# Patient Record
Sex: MALE | Race: BLACK OR AFRICAN AMERICAN | NOT HISPANIC OR LATINO | Employment: OTHER | URBAN - METROPOLITAN AREA
[De-identification: names, ages, dates, MRNs, and addresses within clinical notes are randomized per-mention and may not be internally consistent; named-entity substitution may affect disease eponyms.]

---

## 2017-07-21 ENCOUNTER — LAB VISIT (OUTPATIENT)
Dept: LAB | Facility: HOSPITAL | Age: 62
End: 2017-07-21
Attending: INTERNAL MEDICINE
Payer: MEDICARE

## 2017-07-21 ENCOUNTER — OFFICE VISIT (OUTPATIENT)
Dept: FAMILY MEDICINE | Facility: CLINIC | Age: 62
End: 2017-07-21
Payer: MEDICARE

## 2017-07-21 VITALS
DIASTOLIC BLOOD PRESSURE: 98 MMHG | WEIGHT: 212.31 LBS | BODY MASS INDEX: 32.18 KG/M2 | HEIGHT: 68 IN | OXYGEN SATURATION: 98 % | SYSTOLIC BLOOD PRESSURE: 144 MMHG | HEART RATE: 65 BPM | TEMPERATURE: 97 F | RESPIRATION RATE: 18 BRPM

## 2017-07-21 DIAGNOSIS — Z00.00 ROUTINE ADULT HEALTH MAINTENANCE: ICD-10-CM

## 2017-07-21 DIAGNOSIS — F99 PSYCHIATRIC DIAGNOSIS: ICD-10-CM

## 2017-07-21 DIAGNOSIS — Z00.00 ROUTINE ADULT HEALTH MAINTENANCE: Primary | ICD-10-CM

## 2017-07-21 DIAGNOSIS — I10 ESSENTIAL HYPERTENSION: ICD-10-CM

## 2017-07-21 DIAGNOSIS — R73.9 HYPERGLYCEMIA: ICD-10-CM

## 2017-07-21 DIAGNOSIS — Z12.5 ENCOUNTER FOR PROSTATE CANCER SCREENING: ICD-10-CM

## 2017-07-21 LAB
ALBUMIN SERPL BCP-MCNC: 4 G/DL
ALP SERPL-CCNC: 88 U/L
ALT SERPL W/O P-5'-P-CCNC: 25 U/L
ANION GAP SERPL CALC-SCNC: 11 MMOL/L
AST SERPL-CCNC: 21 U/L
BASOPHILS # BLD AUTO: 0.05 K/UL
BASOPHILS NFR BLD: 0.6 %
BILIRUB SERPL-MCNC: 0.4 MG/DL
BUN SERPL-MCNC: 21 MG/DL
CALCIUM SERPL-MCNC: 9.5 MG/DL
CHLORIDE SERPL-SCNC: 103 MMOL/L
CHOLEST/HDLC SERPL: 4.3 {RATIO}
CO2 SERPL-SCNC: 26 MMOL/L
COMPLEXED PSA SERPL-MCNC: 0.7 NG/ML
CREAT SERPL-MCNC: 1 MG/DL
DIFFERENTIAL METHOD: NORMAL
EOSINOPHIL # BLD AUTO: 0.1 K/UL
EOSINOPHIL NFR BLD: 1.6 %
ERYTHROCYTE [DISTWIDTH] IN BLOOD BY AUTOMATED COUNT: 13.1 %
EST. GFR  (AFRICAN AMERICAN): >60 ML/MIN/1.73 M^2
EST. GFR  (NON AFRICAN AMERICAN): >60 ML/MIN/1.73 M^2
GLUCOSE SERPL-MCNC: 110 MG/DL
HCT VFR BLD AUTO: 45.2 %
HDL/CHOLESTEROL RATIO: 23.5 %
HDLC SERPL-MCNC: 196 MG/DL
HDLC SERPL-MCNC: 46 MG/DL
HGB BLD-MCNC: 14.7 G/DL
LDLC SERPL CALC-MCNC: 129.2 MG/DL
LYMPHOCYTES # BLD AUTO: 2.8 K/UL
LYMPHOCYTES NFR BLD: 32.9 %
MCH RBC QN AUTO: 29.1 PG
MCHC RBC AUTO-ENTMCNC: 32.5 G/DL
MCV RBC AUTO: 90 FL
MONOCYTES # BLD AUTO: 0.4 K/UL
MONOCYTES NFR BLD: 4.9 %
NEUTROPHILS # BLD AUTO: 5 K/UL
NEUTROPHILS NFR BLD: 59.9 %
NONHDLC SERPL-MCNC: 150 MG/DL
PLATELET # BLD AUTO: 225 K/UL
PMV BLD AUTO: 11.9 FL
POTASSIUM SERPL-SCNC: 4.4 MMOL/L
PROT SERPL-MCNC: 7.4 G/DL
RBC # BLD AUTO: 5.05 M/UL
SODIUM SERPL-SCNC: 140 MMOL/L
TRIGL SERPL-MCNC: 104 MG/DL
TSH SERPL DL<=0.005 MIU/L-ACNC: 1.24 UIU/ML
WBC # BLD AUTO: 8.36 K/UL

## 2017-07-21 PROCEDURE — 80053 COMPREHEN METABOLIC PANEL: CPT

## 2017-07-21 PROCEDURE — 99205 OFFICE O/P NEW HI 60 MIN: CPT | Mod: S$PBB,,, | Performed by: INTERNAL MEDICINE

## 2017-07-21 PROCEDURE — 36415 COLL VENOUS BLD VENIPUNCTURE: CPT | Mod: PO

## 2017-07-21 PROCEDURE — 85651 RBC SED RATE NONAUTOMATED: CPT

## 2017-07-21 PROCEDURE — 86803 HEPATITIS C AB TEST: CPT

## 2017-07-21 PROCEDURE — 80061 LIPID PANEL: CPT

## 2017-07-21 PROCEDURE — 99999 PR PBB SHADOW E&M-NEW PATIENT-LVL III: CPT | Mod: PBBFAC,,, | Performed by: INTERNAL MEDICINE

## 2017-07-21 PROCEDURE — 84153 ASSAY OF PSA TOTAL: CPT

## 2017-07-21 PROCEDURE — 83036 HEMOGLOBIN GLYCOSYLATED A1C: CPT

## 2017-07-21 PROCEDURE — 84443 ASSAY THYROID STIM HORMONE: CPT

## 2017-07-21 PROCEDURE — 85025 COMPLETE CBC W/AUTO DIFF WBC: CPT

## 2017-07-21 RX ORDER — LOSARTAN POTASSIUM 25 MG/1
25 TABLET ORAL DAILY
Qty: 30 TABLET | Refills: 1 | Status: SHIPPED | OUTPATIENT
Start: 2017-07-21 | End: 2017-07-28

## 2017-07-21 NOTE — PROGRESS NOTES
Subjective:       Patient ID: Marshall Rivero is a 61 y.o. male.    Chief Complaint: Establish Care; Annual Exam; and Hypertension  ---est care----------moved back to  from [place] recently---------has hx of htn and psychiatric illness for which he has been on multiple meds---and was seeing psychiatry------on no meds now.         Has no new symptoms today---------  HPI  Review of Systems   Constitutional: Negative for activity change, appetite change, chills, diaphoresis, fatigue, fever and unexpected weight change.   HENT: Negative for drooling, ear discharge, ear pain, facial swelling, hearing loss, mouth sores, nosebleeds, postnasal drip, rhinorrhea, sinus pressure, sneezing, sore throat, tinnitus, trouble swallowing and voice change.    Eyes: Negative for photophobia, redness and visual disturbance.   Respiratory: Negative for apnea, cough, choking, chest tightness, shortness of breath and wheezing.    Cardiovascular: Negative for chest pain, palpitations and leg swelling.   Gastrointestinal: Negative for abdominal distention, abdominal pain, anal bleeding, blood in stool, constipation, diarrhea, nausea and vomiting.   Endocrine: Negative for cold intolerance, heat intolerance, polydipsia, polyphagia and polyuria.   Genitourinary: Negative for difficulty urinating, dysuria, enuresis, flank pain, frequency, genital sores, hematuria and urgency.   Musculoskeletal: Negative for arthralgias, gait problem, joint swelling, myalgias, neck pain and neck stiffness.   Skin: Negative for color change, pallor and wound.   Allergic/Immunologic: Negative for food allergies and immunocompromised state.   Neurological: Negative for dizziness, tremors, seizures, syncope, facial asymmetry, speech difficulty, weakness, light-headedness, numbness and headaches.   Hematological: Negative for adenopathy. Does not bruise/bleed easily.   Psychiatric/Behavioral: Negative for agitation, behavioral problems, confusion, decreased  concentration, dysphoric mood, hallucinations, self-injury, sleep disturbance and suicidal ideas. The patient is not nervous/anxious and is not hyperactive.        Objective:      Physical Exam   Constitutional: He is oriented to person, place, and time. He appears well-developed and well-nourished. No distress.   HENT:   Head: Normocephalic and atraumatic.   Eyes: Pupils are equal, round, and reactive to light. No scleral icterus.   Neck: Normal range of motion. Neck supple. No JVD present. Carotid bruit is not present. No tracheal deviation present. No thyromegaly present.   Cardiovascular: Normal rate, regular rhythm, normal heart sounds and intact distal pulses.    Pulmonary/Chest: Effort normal and breath sounds normal. No respiratory distress. He has no wheezes. He has no rales. He exhibits no tenderness.   Abdominal: Soft. Bowel sounds are normal. He exhibits no distension. There is no tenderness. There is no rebound and no guarding.   Musculoskeletal: Normal range of motion. He exhibits no edema or tenderness.   Lymphadenopathy:     He has no cervical adenopathy.   Neurological: He is alert and oriented to person, place, and time.   Skin: Skin is warm and dry. No rash noted. He is not diaphoretic. No erythema. No pallor.   Psychiatric: He has a normal mood and affect.   Nursing note and vitals reviewed.      Assessment:       1. Routine adult health maintenance    2. Essential hypertension    3. Hyperglycemia    4. Encounter for prostate cancer screening    5. Psychiatric diagnosis        Plan:        start losartan 25 mg a day for bp, watch diet,exercise.    He works out-stays active-.           States had recent cardiac eval------was good and up on colon.              Check cmp,cbc,lipids,tsh,psa,hga1c,hepc,esr.        He declines f/u with psychiatry..                          F/u 1 month.        Attempt to get records.    ------

## 2017-07-22 LAB
ERYTHROCYTE [SEDIMENTATION RATE] IN BLOOD BY WESTERGREN METHOD: 5 MM/HR
ESTIMATED AVG GLUCOSE: 123 MG/DL
HBA1C MFR BLD HPLC: 5.9 %

## 2017-07-24 LAB — HCV AB SERPL QL IA: NEGATIVE

## 2017-07-28 RX ORDER — LOSARTAN POTASSIUM 50 MG/1
50 TABLET ORAL DAILY
Qty: 90 TABLET | Refills: 3 | Status: SHIPPED | OUTPATIENT
Start: 2017-07-28 | End: 2017-08-08

## 2017-07-28 RX ORDER — LOSARTAN POTASSIUM 25 MG/1
50 TABLET ORAL DAILY
Qty: 30 TABLET | Refills: 1 | Status: CANCELLED | OUTPATIENT
Start: 2017-07-28 | End: 2018-07-28

## 2017-07-28 NOTE — TELEPHONE ENCOUNTER
----- Message from Jonathan Solis sent at 7/28/2017 10:38 AM CDT -----  Contact: Pt   Pt states the wrong dosage and medication (losartan) was called in, pt says he should have 50 mg when pt went to  medication it was only 25 mg pt is requesting to be called back to discuss further..[phone] (home)

## 2017-08-01 ENCOUNTER — TELEPHONE (OUTPATIENT)
Dept: FAMILY MEDICINE | Facility: CLINIC | Age: 62
End: 2017-08-01

## 2017-08-01 NOTE — TELEPHONE ENCOUNTER
Losartan was started last visit----what is he taking?                    Labs show chol a little high and pre diabetes-----has f/u appt to discuss-------can make sooner appt if he would like------

## 2017-08-07 ENCOUNTER — TELEPHONE (OUTPATIENT)
Dept: FAMILY MEDICINE | Facility: CLINIC | Age: 62
End: 2017-08-07

## 2017-08-07 NOTE — TELEPHONE ENCOUNTER
Advised pt he could go to [facility] today or make appt for tomorrow.   appt scheduled for tomorrow.

## 2017-08-07 NOTE — TELEPHONE ENCOUNTER
Pt is saying the medication recently prescribed is making him feel bad. Thinks he may be taking the wrong medication.

## 2017-08-08 ENCOUNTER — OFFICE VISIT (OUTPATIENT)
Dept: FAMILY MEDICINE | Facility: CLINIC | Age: 62
End: 2017-08-08
Payer: MEDICARE

## 2017-08-08 VITALS
HEART RATE: 72 BPM | WEIGHT: 216.25 LBS | RESPIRATION RATE: 18 BRPM | SYSTOLIC BLOOD PRESSURE: 146 MMHG | DIASTOLIC BLOOD PRESSURE: 94 MMHG | HEIGHT: 68 IN | TEMPERATURE: 98 F | BODY MASS INDEX: 32.77 KG/M2 | OXYGEN SATURATION: 98 %

## 2017-08-08 DIAGNOSIS — F99 PSYCHIATRIC DIAGNOSIS: ICD-10-CM

## 2017-08-08 DIAGNOSIS — I10 ESSENTIAL HYPERTENSION: Primary | ICD-10-CM

## 2017-08-08 DIAGNOSIS — E78.00 HYPERCHOLESTEROLEMIA: ICD-10-CM

## 2017-08-08 DIAGNOSIS — R73.02 IGT (IMPAIRED GLUCOSE TOLERANCE): ICD-10-CM

## 2017-08-08 PROCEDURE — 3008F BODY MASS INDEX DOCD: CPT | Mod: ,,, | Performed by: INTERNAL MEDICINE

## 2017-08-08 PROCEDURE — 99999 PR PBB SHADOW E&M-EST. PATIENT-LVL IV: CPT | Mod: PBBFAC,,, | Performed by: INTERNAL MEDICINE

## 2017-08-08 PROCEDURE — 99214 OFFICE O/P EST MOD 30 MIN: CPT | Mod: S$PBB,,, | Performed by: INTERNAL MEDICINE

## 2017-08-08 PROCEDURE — 99214 OFFICE O/P EST MOD 30 MIN: CPT | Mod: PBBFAC,PO | Performed by: INTERNAL MEDICINE

## 2017-08-08 RX ORDER — VALSARTAN 160 MG/1
160 TABLET ORAL DAILY
Qty: 30 TABLET | Refills: 3 | Status: SHIPPED | OUTPATIENT
Start: 2017-08-08 | End: 2017-09-13 | Stop reason: SDUPTHER

## 2017-08-08 NOTE — PROGRESS NOTES
Subjective:       Patient ID: Marshall Rivero is a 61 y.o. male.    Chief Complaint: Consult; Hypertension; Hyperlipidemia; and Insulin Resistance    Hypertension   The problem is resistant. Pertinent negatives include no chest pain, headaches, neck pain, palpitations or shortness of breath.   Hyperlipidemia   This is a chronic problem. Pertinent negatives include no chest pain, myalgias or shortness of breath.     Review of Systems   Constitutional: Negative for activity change, appetite change, chills, diaphoresis, fatigue, fever and unexpected weight change.   HENT: Negative for drooling, ear discharge, ear pain, facial swelling, hearing loss, mouth sores, nosebleeds, postnasal drip, rhinorrhea, sinus pressure, sneezing, sore throat, tinnitus, trouble swallowing and voice change.    Eyes: Negative for photophobia and redness.   Respiratory: Negative for apnea, cough, choking, chest tightness, shortness of breath and wheezing.    Cardiovascular: Negative for chest pain, palpitations and leg swelling.   Gastrointestinal: Negative for abdominal distention, abdominal pain, anal bleeding, blood in stool, constipation, diarrhea, nausea and vomiting.   Endocrine: Negative for cold intolerance, heat intolerance, polydipsia, polyphagia and polyuria.   Genitourinary: Negative for difficulty urinating, dysuria, enuresis, flank pain, frequency, genital sores, hematuria and urgency.   Musculoskeletal: Negative for arthralgias, back pain, gait problem, joint swelling, myalgias, neck pain and neck stiffness.   Skin: Negative for color change, pallor, rash and wound.   Allergic/Immunologic: Negative for food allergies and immunocompromised state.   Neurological: Negative for dizziness, tremors, seizures, syncope, facial asymmetry, speech difficulty, weakness, light-headedness, numbness and headaches.   Hematological: Negative for adenopathy. Does not bruise/bleed easily.   Psychiatric/Behavioral: Negative for agitation, behavioral  problems, decreased concentration, dysphoric mood, hallucinations, self-injury and suicidal ideas. The patient is not nervous/anxious and is not hyperactive.        Objective:      Physical Exam   Constitutional: He is oriented to person, place, and time. He appears well-developed and well-nourished. No distress.   HENT:   Head: Normocephalic and atraumatic.   Eyes: Pupils are equal, round, and reactive to light. No scleral icterus.   Neck: Normal range of motion. Neck supple. No JVD present. Carotid bruit is not present. No tracheal deviation present. No thyromegaly present.   Cardiovascular: Normal rate, regular rhythm, normal heart sounds and intact distal pulses.    Pulmonary/Chest: Effort normal and breath sounds normal. No respiratory distress. He has no wheezes. He has no rales. He exhibits no tenderness.   Abdominal: Soft. Bowel sounds are normal. He exhibits no distension. There is no tenderness. There is no rebound and no guarding.   Musculoskeletal: Normal range of motion. He exhibits no edema.   Lymphadenopathy:     He has no cervical adenopathy.   Neurological: He is alert and oriented to person, place, and time.   Skin: Skin is warm and dry. No rash noted. He is not diaphoretic. No erythema. No pallor.   Psychiatric: Thought content normal.       Assessment:       1. Essential hypertension    2. Hypercholesterolemia    3. IGT (impaired glucose tolerance)    4. Psychiatric diagnosis        Plan:        change losartan to valsartan 160 mg a day for better bp control.    Watch diet,exercise.                 Notes/labs reviewed.              F/u 1 month.

## 2017-08-30 ENCOUNTER — PATIENT OUTREACH (OUTPATIENT)
Dept: ADMINISTRATIVE | Facility: HOSPITAL | Age: 62
End: 2017-08-30

## 2017-09-13 ENCOUNTER — TELEPHONE (OUTPATIENT)
Dept: FAMILY MEDICINE | Facility: CLINIC | Age: 62
End: 2017-09-13

## 2017-09-13 ENCOUNTER — OFFICE VISIT (OUTPATIENT)
Dept: FAMILY MEDICINE | Facility: CLINIC | Age: 62
End: 2017-09-13
Payer: MEDICARE

## 2017-09-13 VITALS
RESPIRATION RATE: 18 BRPM | WEIGHT: 217.13 LBS | OXYGEN SATURATION: 98 % | DIASTOLIC BLOOD PRESSURE: 96 MMHG | SYSTOLIC BLOOD PRESSURE: 138 MMHG | BODY MASS INDEX: 32.91 KG/M2 | HEIGHT: 68 IN | TEMPERATURE: 97 F | HEART RATE: 89 BPM

## 2017-09-13 DIAGNOSIS — Z91.199 NONCOMPLIANCE: ICD-10-CM

## 2017-09-13 DIAGNOSIS — I10 ESSENTIAL HYPERTENSION: ICD-10-CM

## 2017-09-13 DIAGNOSIS — F99 PSYCHIATRIC DIAGNOSIS: Primary | ICD-10-CM

## 2017-09-13 DIAGNOSIS — E78.00 HYPERCHOLESTEROLEMIA: ICD-10-CM

## 2017-09-13 DIAGNOSIS — R73.02 IGT (IMPAIRED GLUCOSE TOLERANCE): ICD-10-CM

## 2017-09-13 PROCEDURE — 99214 OFFICE O/P EST MOD 30 MIN: CPT | Mod: PBBFAC,PO | Performed by: INTERNAL MEDICINE

## 2017-09-13 PROCEDURE — 99999 PR PBB SHADOW E&M-EST. PATIENT-LVL IV: CPT | Mod: PBBFAC,,, | Performed by: INTERNAL MEDICINE

## 2017-09-13 PROCEDURE — 99214 OFFICE O/P EST MOD 30 MIN: CPT | Mod: S$PBB,,, | Performed by: INTERNAL MEDICINE

## 2017-09-13 RX ORDER — VALSARTAN 160 MG/1
160 TABLET ORAL DAILY
Qty: 30 TABLET | Refills: 3 | Status: SHIPPED | OUTPATIENT
Start: 2017-09-13 | End: 2017-09-13 | Stop reason: SDUPTHER

## 2017-09-13 RX ORDER — VALSARTAN 160 MG/1
160 TABLET ORAL DAILY
Qty: 30 TABLET | Refills: 3 | Status: SHIPPED | OUTPATIENT
Start: 2017-09-13 | End: 2017-10-16

## 2017-09-13 NOTE — TELEPHONE ENCOUNTER
----- Message from Layo Forrester sent at 9/13/2017 11:47 AM CDT -----  Contact: Pt   Pt needs to speak with nurse regarding medication that he was given. Pt doesn't feel that it is safe. Please give pt a call at [phone].

## 2017-09-13 NOTE — PROGRESS NOTES
Subjective:       Patient ID: Marshall Rivero is a 61 y.o. male.    Chief Complaint: Follow-up; Hypertension; and Hyperlipidemia    Hypertension   This is a chronic problem. Pertinent negatives include no chest pain, headaches, neck pain, palpitations or shortness of breath. Compliance problems: does not take med routinely.    Hyperlipidemia   Pertinent negatives include no chest pain, myalgias or shortness of breath. Current antihyperlipidemic treatment includes exercise and diet change.     Review of Systems   Constitutional: Negative for activity change, appetite change, chills, diaphoresis, fatigue, fever and unexpected weight change.   HENT: Negative for drooling, ear discharge, ear pain, facial swelling, hearing loss, mouth sores, nosebleeds, postnasal drip, rhinorrhea, sinus pressure, sneezing, sore throat, tinnitus, trouble swallowing and voice change.    Eyes: Negative for photophobia and redness.   Respiratory: Negative for apnea, cough, choking, chest tightness, shortness of breath and wheezing.    Cardiovascular: Negative for chest pain, palpitations and leg swelling.   Gastrointestinal: Negative for abdominal distention, abdominal pain, anal bleeding, blood in stool, constipation, diarrhea, nausea and vomiting.   Endocrine: Negative for cold intolerance, heat intolerance, polydipsia, polyphagia and polyuria.   Genitourinary: Negative for difficulty urinating, dysuria, enuresis, flank pain, frequency, genital sores, hematuria and urgency.   Musculoskeletal: Negative for arthralgias, back pain, gait problem, joint swelling, myalgias, neck pain and neck stiffness.   Skin: Negative for color change, pallor and rash.   Allergic/Immunologic: Negative for food allergies and immunocompromised state.   Neurological: Negative for dizziness, tremors, seizures, syncope, facial asymmetry, speech difficulty, weakness, light-headedness, numbness and headaches.   Hematological: Negative for adenopathy. Does not  bruise/bleed easily.   Psychiatric/Behavioral: Negative for agitation, decreased concentration, self-injury, sleep disturbance and suicidal ideas. The patient is not hyperactive.        Objective:      Physical Exam   Constitutional: He is oriented to person, place, and time. He appears well-developed and well-nourished. No distress.   HENT:   Head: Normocephalic and atraumatic.   Eyes: Pupils are equal, round, and reactive to light.   Neck: Normal range of motion. Neck supple. Carotid bruit is not present.   Cardiovascular: Normal rate, regular rhythm, normal heart sounds and intact distal pulses.    Pulmonary/Chest: Effort normal and breath sounds normal. No respiratory distress. He has no wheezes. He has no rales. He exhibits no tenderness.   Abdominal: Soft. Bowel sounds are normal.   Musculoskeletal: Normal range of motion. He exhibits no edema or tenderness.   Neurological: He is alert and oriented to person, place, and time.   Skin: Skin is warm and dry. No rash noted. He is not diaphoretic. No erythema. No pallor.   Nursing note and vitals reviewed.      Assessment:       1. Psychiatric diagnosis    2. IGT (impaired glucose tolerance)    3. Hypercholesterolemia    4. Essential hypertension    5. Noncompliance        Plan:         resume valsartan 160 mg a day, watch diet,exercise.               Notes/labs reviewed.                    -----psychiatry consult--------              consult---              F/u 1 month.

## 2017-09-18 ENCOUNTER — HOSPITAL ENCOUNTER (EMERGENCY)
Facility: HOSPITAL | Age: 62
Discharge: PSYCHIATRIC OR SUBSTANCE USE FACILITY | End: 2017-09-18
Attending: EMERGENCY MEDICINE
Payer: MEDICARE

## 2017-09-18 VITALS
WEIGHT: 217 LBS | DIASTOLIC BLOOD PRESSURE: 80 MMHG | HEIGHT: 68 IN | SYSTOLIC BLOOD PRESSURE: 160 MMHG | BODY MASS INDEX: 32.89 KG/M2 | RESPIRATION RATE: 18 BRPM | HEART RATE: 70 BPM | TEMPERATURE: 98 F | OXYGEN SATURATION: 97 %

## 2017-09-18 DIAGNOSIS — R45.851 SUICIDAL IDEATION: Primary | ICD-10-CM

## 2017-09-18 LAB
ALBUMIN SERPL BCP-MCNC: 4.3 G/DL
ALP SERPL-CCNC: 92 U/L
ALT SERPL W/O P-5'-P-CCNC: 28 U/L
AMPHET+METHAMPHET UR QL: NEGATIVE
ANION GAP SERPL CALC-SCNC: 10 MMOL/L
APAP SERPL-MCNC: <3 UG/ML
AST SERPL-CCNC: 19 U/L
BARBITURATES UR QL SCN>200 NG/ML: NEGATIVE
BASOPHILS # BLD AUTO: 0.1 K/UL
BASOPHILS NFR BLD: 0.9 %
BENZODIAZ UR QL SCN>200 NG/ML: NEGATIVE
BILIRUB SERPL-MCNC: 0.4 MG/DL
BILIRUB UR QL STRIP: NEGATIVE
BUN SERPL-MCNC: 15 MG/DL
BZE UR QL SCN: NEGATIVE
CALCIUM SERPL-MCNC: 10 MG/DL
CANNABINOIDS UR QL SCN: NEGATIVE
CHLORIDE SERPL-SCNC: 102 MMOL/L
CLARITY UR: CLEAR
CO2 SERPL-SCNC: 26 MMOL/L
COLOR UR: YELLOW
CREAT SERPL-MCNC: 0.9 MG/DL
CREAT UR-MCNC: 124.6 MG/DL
DIFFERENTIAL METHOD: NORMAL
EOSINOPHIL # BLD AUTO: 0.1 K/UL
EOSINOPHIL NFR BLD: 0.7 %
ERYTHROCYTE [DISTWIDTH] IN BLOOD BY AUTOMATED COUNT: 13.4 %
EST. GFR  (AFRICAN AMERICAN): >60 ML/MIN/1.73 M^2
EST. GFR  (NON AFRICAN AMERICAN): >60 ML/MIN/1.73 M^2
ETHANOL SERPL-MCNC: <10 MG/DL
GLUCOSE SERPL-MCNC: 94 MG/DL
GLUCOSE UR QL STRIP: NEGATIVE
HCT VFR BLD AUTO: 44.2 %
HGB BLD-MCNC: 15.1 G/DL
HGB UR QL STRIP: NEGATIVE
KETONES UR QL STRIP: NEGATIVE
LEUKOCYTE ESTERASE UR QL STRIP: NEGATIVE
LYMPHOCYTES # BLD AUTO: 2.9 K/UL
LYMPHOCYTES NFR BLD: 26.8 %
MCH RBC QN AUTO: 30 PG
MCHC RBC AUTO-ENTMCNC: 34.2 G/DL
MCV RBC AUTO: 88 FL
METHADONE UR QL SCN>300 NG/ML: NEGATIVE
MONOCYTES # BLD AUTO: 0.6 K/UL
MONOCYTES NFR BLD: 5.6 %
NEUTROPHILS # BLD AUTO: 7 K/UL
NEUTROPHILS NFR BLD: 66 %
NITRITE UR QL STRIP: NEGATIVE
OPIATES UR QL SCN: NEGATIVE
PCP UR QL SCN>25 NG/ML: NEGATIVE
PH UR STRIP: 7 [PH] (ref 5–8)
PLATELET # BLD AUTO: 235 K/UL
PMV BLD AUTO: 11.1 FL
POTASSIUM SERPL-SCNC: 3.7 MMOL/L
PROT SERPL-MCNC: 8.2 G/DL
PROT UR QL STRIP: NEGATIVE
RBC # BLD AUTO: 5.03 M/UL
SALICYLATES SERPL-MCNC: <5 MG/DL
SODIUM SERPL-SCNC: 138 MMOL/L
SP GR UR STRIP: 1.02 (ref 1–1.03)
TOXICOLOGY INFORMATION: NORMAL
TSH SERPL DL<=0.005 MIU/L-ACNC: 0.82 UIU/ML
URN SPEC COLLECT METH UR: NORMAL
UROBILINOGEN UR STRIP-ACNC: NEGATIVE EU/DL
WBC # BLD AUTO: 10.67 K/UL

## 2017-09-18 PROCEDURE — 99285 EMERGENCY DEPT VISIT HI MDM: CPT

## 2017-09-18 PROCEDURE — 80320 DRUG SCREEN QUANTALCOHOLS: CPT

## 2017-09-18 PROCEDURE — 84443 ASSAY THYROID STIM HORMONE: CPT

## 2017-09-18 PROCEDURE — 80329 ANALGESICS NON-OPIOID 1 OR 2: CPT

## 2017-09-18 PROCEDURE — 80053 COMPREHEN METABOLIC PANEL: CPT

## 2017-09-18 PROCEDURE — 80307 DRUG TEST PRSMV CHEM ANLYZR: CPT

## 2017-09-18 PROCEDURE — 85025 COMPLETE CBC W/AUTO DIFF WBC: CPT

## 2017-09-18 PROCEDURE — 81003 URINALYSIS AUTO W/O SCOPE: CPT

## 2017-09-18 PROCEDURE — G0426 INPT/ED TELECONSULT50: HCPCS | Mod: GT,,,

## 2017-09-18 NOTE — ED NOTES
Belongings placed in locker #1: black shoes, black shorts, keys, mail, white shirt. Valuables secured with security, see patient valuables record.
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
## 2017-09-18 NOTE — ED PROVIDER NOTES
"SCRIBE #1 NOTE: I, Cedrick Dunlap, am scribing for, and in the presence of, Nion Quintero Jr., MD. I have scribed the entire note.      History      Chief Complaint   Patient presents with    Psychiatric Evaluation     states he is being stalked, and that a group of men are stealing his memory       Review of patient's allergies indicates:  No Known Allergies     HPI   HPI    9/18/2017, 3:10 PM   History obtained from the patient      History of Present Illness: Marshall Rivero is a 61 y.o. male patient who presents to the Emergency Department for psychiatric evaluation. Pt reports he was sent from the police station after trying to file a restraining order against unknown stalkers. Pt reports that people have been following him for "years". Pt states these unknown people are able to place mind altering devices on himself. Pt states that his stalkers can make him hear and see things as well as make him go blind. Sxs are intermittent and moderate in severity. Pt believes there is "equipment" inside of his body. There are no mitigating or exacerbating factors noted.  Pt denies any fever, N/V/D, SI, HI, LOC, and all other sxs at this time. No further complaints or concerns at this time.     Arrival mode: Personal vehicle      PCP: Primary Doctor No       Past Medical History:  Past Medical History:   Diagnosis Date    Depression     Hypertension        Past Surgical History:  Unknown      Family History:  Family History   Problem Relation Age of Onset    Diabetes Mother     Hypertension Mother     Heart disease Mother     Hyperlipidemia Mother     Arthritis Mother     Diabetes Father     Hypertension Father     Hyperlipidemia Father     Heart disease Father     Arthritis Father        Social History:  Social History     Social History Main Topics    Smoking status: Never Smoker    Smokeless tobacco: Never Used    Alcohol use Yes      Comment: Occassionally    Drug use: No    Sexual activity: Yes "       ROS   Review of Systems   Constitutional: Negative for fever.   HENT: Negative for sore throat.    Respiratory: Negative for shortness of breath.    Cardiovascular: Negative for chest pain.   Gastrointestinal: Negative for abdominal pain, diarrhea, nausea and vomiting.   Genitourinary: Negative for dysuria.   Musculoskeletal: Negative for back pain.   Skin: Negative for rash.   Neurological: Negative for weakness.   Hematological: Does not bruise/bleed easily.   Psychiatric/Behavioral: Negative for suicidal ideas.        (-) HI       Physical Exam      Initial Vitals [09/18/17 1401]   BP Pulse Resp Temp SpO2   -- 86 18 98.5 °F (36.9 °C) 99 %      MAP       --          Physical Exam  Nursing Notes and Vital Signs Reviewed.  Constitutional: Patient is in no acute distress. Well-developed and well-nourished.  Head: Atraumatic. Normocephalic.  Eyes: PERRL. EOM intact. Conjunctivae are not pale. No scleral icterus.  ENT: Mucous membranes are moist. Oropharynx is clear and symmetric.    Neck: Supple. Full ROM. No lymphadenopathy.  Cardiovascular: Regular rate. Regular rhythm. No murmurs, rubs, or gallops. Distal pulses are 2+ and symmetric.  Pulmonary/Chest: No respiratory distress. Clear to auscultation bilaterally. No wheezing, rales, or rhonchi.  Abdominal: Soft and non-distended.  There is no tenderness.  No rebound, guarding, or rigidity. Good bowel sounds.  Genitourinary: No CVA tenderness  Musculoskeletal: Moves all extremities. No obvious deformities. No edema. No calf tenderness.  Skin: Warm and dry.  Neurological:  Alert, awake, and appropriate.  Normal speech.  No acute focal neurological deficits are appreciated.  Psychiatric:               Behavior: cooperative              Thought Process: scattered              Suicidal Ideations: No               Suicidal Plan: No specific plan to harm self              Homicidal Ideations: No              Hallucinations: auditory and visual      ED Course   "  Procedures  ED Vital Signs:  Vitals:    09/18/17 1401   Pulse: 86   Resp: 18   Temp: 98.5 °F (36.9 °C)   TempSrc: Oral   SpO2: 99%   Weight: 98.4 kg (217 lb)   Height: 5' 8" (1.727 m)       Abnormal Lab Results:  Labs Reviewed   ACETAMINOPHEN LEVEL - Abnormal; Notable for the following:        Result Value    Acetaminophen (Tylenol), Serum <3.0 (*)     All other components within normal limits   SALICYLATE LEVEL - Abnormal; Notable for the following:     Salicylate Lvl <5.0 (*)     All other components within normal limits   CBC W/ AUTO DIFFERENTIAL   COMPREHENSIVE METABOLIC PANEL   URINALYSIS   DRUG SCREEN PANEL, URINE EMERGENCY   ALCOHOL,MEDICAL (ETHANOL)   TSH        All Lab Results:  Results for orders placed or performed during the hospital encounter of 09/18/17   CBC auto differential   Result Value Ref Range    WBC 10.67 3.90 - 12.70 K/uL    RBC 5.03 4.60 - 6.20 M/uL    Hemoglobin 15.1 14.0 - 18.0 g/dL    Hematocrit 44.2 40.0 - 54.0 %    MCV 88 82 - 98 fL    MCH 30.0 27.0 - 31.0 pg    MCHC 34.2 32.0 - 36.0 g/dL    RDW 13.4 11.5 - 14.5 %    Platelets 235 150 - 350 K/uL    MPV 11.1 9.2 - 12.9 fL    Gran # 7.0 1.8 - 7.7 K/uL    Lymph # 2.9 1.0 - 4.8 K/uL    Mono # 0.6 0.3 - 1.0 K/uL    Eos # 0.1 0.0 - 0.5 K/uL    Baso # 0.10 0.00 - 0.20 K/uL    Gran% 66.0 38.0 - 73.0 %    Lymph% 26.8 18.0 - 48.0 %    Mono% 5.6 4.0 - 15.0 %    Eosinophil% 0.7 0.0 - 8.0 %    Basophil% 0.9 0.0 - 1.9 %    Differential Method Automated    Comprehensive metabolic panel   Result Value Ref Range    Sodium 138 136 - 145 mmol/L    Potassium 3.7 3.5 - 5.1 mmol/L    Chloride 102 95 - 110 mmol/L    CO2 26 23 - 29 mmol/L    Glucose 94 70 - 110 mg/dL    BUN, Bld 15 6 - 20 mg/dL    Creatinine 0.9 0.5 - 1.4 mg/dL    Calcium 10.0 8.7 - 10.5 mg/dL    Total Protein 8.2 6.0 - 8.4 g/dL    Albumin 4.3 3.5 - 5.2 g/dL    Total Bilirubin 0.4 0.1 - 1.0 mg/dL    Alkaline Phosphatase 92 55 - 135 U/L    AST 19 10 - 40 U/L    ALT 28 10 - 44 U/L    Anion Gap " 10 8 - 16 mmol/L    eGFR if African American >60 >60 mL/min/1.73 m^2    eGFR if non African American >60 >60 mL/min/1.73 m^2   Urinalysis - clean catch   Result Value Ref Range    Specimen UA Urine, Clean Catch     Color, UA Yellow Yellow, Straw, Phyllis    Appearance, UA Clear Clear    pH, UA 7.0 5.0 - 8.0    Specific Gravity, UA 1.020 1.005 - 1.030    Protein, UA Negative Negative    Glucose, UA Negative Negative    Ketones, UA Negative Negative    Bilirubin (UA) Negative Negative    Occult Blood UA Negative Negative    Nitrite, UA Negative Negative    Urobilinogen, UA Negative <2.0 EU/dL    Leukocytes, UA Negative Negative   Drug screen panel, emergency   Result Value Ref Range    Benzodiazepines Negative     Methadone metabolites Negative     Cocaine (Metab.) Negative     Opiate Scrn, Ur Negative     Barbiturate Screen, Ur Negative     Amphetamine Screen, Ur Negative     THC Negative     Phencyclidine Negative     Creatinine, Random Ur 124.6 23.0 - 375.0 mg/dL    Toxicology Information SEE COMMENT    Ethanol   Result Value Ref Range    Alcohol, Medical, Serum <10 <10 mg/dL   Acetaminophen level   Result Value Ref Range    Acetaminophen (Tylenol), Serum <3.0 (L) 10.0 - 20.0 ug/mL   Salicylate level   Result Value Ref Range    Salicylate Lvl <5.0 (L) 15.0 - 30.0 mg/dL              The Emergency Provider reviewed the vital signs and test results, which are outlined above.    ED Discussion     3:00 PM: The PEC hold has been issued by Dr. Quintero at this time for hallucinations.    4:01 PM: Pt has been medically cleared by Dr. Quintero at this time. Reassessed pt at this time. Pt is resting comfortably and appears in no acute distress. There are no psychiatric services offered at this facility. D/w pt all pertinent ED information and plan to transfer to psychiatric facility for psychiatric treatment. Pt verbalizes understanding. Patient being transferred by [facility] for ongoing personal protection en route. Pt will be  transported by personnel trained in CPR and CPI. All questions and complaints have been addressed at this time. Pt condition is stable at this time and is clear to transfer to psychiatric facility at this time.     4:05 PM: Dr. Quintero discussed the pt's case with Dr. Eileen Tillman (Tele-psych) who recommends pt meets PEC criteria.    ED Medication(s):  Medications - No data to display    New Prescriptions    No medications on file             Medical Decision Making    Medical Decision Making:   Clinical Tests:   Lab Tests: Ordered and Reviewed           Scribe Attestation:   Scribe #1: I performed the above scribed service and the documentation accurately describes the services I performed. I attest to the accuracy of the note.    Attending:   Physician Attestation Statement for Scribe #1: I, Nino Quintero Jr., MD, personally performed the services described in this documentation, as scribed by Cedrick Dunlap, in my presence, and it is both accurate and complete.          Clinical Impression       ICD-10-CM ICD-9-CM   1. Suicidal ideation R45.851 V62.84       Disposition:   Disposition: Transferred  Condition: Stable         Nino Quintero Jr., MD  09/18/17 5773

## 2017-09-19 NOTE — ED NOTES
Valuables and bag of clothes returned to [facility].  Patient placed in paper scrubs and security accompanied patient and [facility] to parking lot.

## 2017-09-19 NOTE — PROVIDER PROGRESS NOTES - EMERGENCY DEPT.
Encounter Date: 9/18/2017    ED Physician Progress Notes       SCRIBE NOTE: I, Komal Holden, am scribing for, and in the presence of,  Eddie Hendrickson MD.  Physician Statement: IEddie MD, personally performed the services described in this documentation as scribed by Komal Holden in my presence, and it is both accurate and complete.       7:31 PM: Pt has been accepted at [facility] in [place] at this time. Accepting physician is Dr. Musa. Pt will be transported by [facility] for ongoing personal protection en route. Pt will be transported by personnel trained in CPR and CPI. Risks and benefits of transfer/travel were discussed with pt priorly. Pt expresses understand.

## 2017-10-02 ENCOUNTER — OFFICE VISIT (OUTPATIENT)
Dept: PSYCHIATRY | Facility: CLINIC | Age: 62
End: 2017-10-02
Payer: MEDICARE

## 2017-10-02 DIAGNOSIS — F20.0 PARANOID SCHIZOPHRENIA: Primary | ICD-10-CM

## 2017-10-02 PROCEDURE — 90791 PSYCH DIAGNOSTIC EVALUATION: CPT | Mod: S$PBB,,, | Performed by: SOCIAL WORKER

## 2017-10-02 PROCEDURE — 90791 PSYCH DIAGNOSTIC EVALUATION: CPT | Mod: PBBFAC,PO | Performed by: SOCIAL WORKER

## 2017-10-02 NOTE — PROGRESS NOTES
"Psychiatry Initial Visit (PhD/LCSW)  Diagnostic Interview - CPT 37124    Date: 10/2/2017    Site: [facility]    Referral source: Emergency Department    Clinical status of patient: Outpatient    Marshall Rivero, a 61 y.o. male, for initial evaluation visit.  Met with patient.    Chief complaint/reason for encounter: anxiety and psychosis    History of present illness:  He has "mind control" technology that was put all over his body.  He says it has been done for years.  He says it is a business and they are making money off of him.  They are using invisible cloaking technology unless you have been chemically or electronically altered.  They work at keeping him enslaved.  There is another race of beings on earth.  They are able to move in and out of you.  He does not feel he needs counseling.  He has been in and out of it for thirty years.  He has taken four courses of psychology.  He feels that you can't tell people the truth.  He feels that people would rather lie.  He is trying to stay on his medication.  He admits he has been on all kind of medication.  He does not want to be walking around like a zombie.  He let them experiment in society so he could have the knowledge of what is going on in society.  He feels that humans are being preyed upon.  He feels it's time for people to understand what's really happening.      Pain: 0    Symptoms:   · Mood: insomnia, poor concentration and social isolation  · Anxiety: excessive anxiety/worry and restlessness/keyed up  · Substance abuse: denied  · Cognitive functioning: psychosis  · Health behaviors: noncontributory    Psychiatric history: He has been at [facility] for two weeks in [place].  He feels he could have been discharged on Friday.  He was admitted because he was completely stressed out.  They put him on medication in the hospital.      Medical history: none.  He has a scar on his head from when he was [age] years old.  He says the blood on his head " "traumatized him.      Family history of psychiatric illness: There are several people with alcohol problems in his family.    Social history (marriage, employment, etc.):  Patient's mother, Ronda,  in .  She was 49.  "They" say it was diabetes.  The patient was [age].  She lived in [place].  His mother was a  with the school board system.  His father, Itz,  in  from digestive problems.  He was 79.  He lived in [place].  He was a  with [employer].  His parents were  about forty years.  The patient is the youngest of five children, four boys and one girl.  His sister, Loly,  in .  She lived in [place].  She was .  Her leg had been cut off from diabetes.  She was on disability due to diabetes.  She a radiation technician in the hospital.  Itz is  in .  He was  with no children.  He taught school.  He had a master's degree in physics.  He lived in [place].  He was in [employer].  Richard, 65, lives in [place].  He is  with three children.  He is retired from [employer].  He is a .  Lance  in the late 80s.  They say he was brain dead.  He was a "wild edu."  He was single with no children.   He was a .  He had a master's in education.  He was pretty close to this brother.  He graduated from [employer] in .  He played running back in high school.  He played football for one year at [employer].  He started working two jobs.  He worked several jobs on campus.  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  He graduated from [employer] back in .  He got a degree in business communications.  He has a YAS in  from [employer].  He went out west in .  He went to [place].  He wanted to retire there.  He likes the climate.  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  He worked off shore in semester " "break.  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  The patient worked at the [employer] near [place].  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  He did  work.  He was  for 12 years to Jane because he is not "online."  She was in the network.  His daughter, Thai Burden, lives in [place].  She is single.  He has been disconnected from her for awhile.  She is a .  His son, Luis Philip, lives in [place].  He is the .  He is single with no children.  The patient has a house in [place].  He stays with his friend, Valencia.  He has been staying with her since January.  He was living on the street off and on.   He is [employer].  He likes to do weight lifting.  He keeps himself physically active.    Substance use:   Alcohol: it may be months before he drinks   Drugs: none   Tobacco: none   Caffeine: none    Current medications and drug reactions (include OTC, herbal): see medication list     Strengths and liabilities: Strength: Patient is expressive/articulate., Strength: Patient is motivated for change., Strength: Patient is physically healthy., Strength: Patient has positive support network., Strength: Patient is stable., Liability: Patient has poor judgment, Liability: Patient lacks coping skills.    Current Evaluation:     Mental Status Exam:  General Appearance:  age appropriate, bearded, casually dressed, neatly groomed   Speech: normal tone, normal rate, normal pitch, normal volume      Level of Cooperation: cooperative      Thought Processes: tangential   Mood: anxious      Thought Content: delusions: yes no homicidal or suicidal ideation   Affect: guarded, anxious   Orientation: Oriented x3   Memory: recent >  intact, remote >  intact   Attention Span & Concentration: intact   Fund of General Knowledge: intact and appropriate to age and level of " education   Abstract Reasoning:    Judgment & Insight: poor     Language  intact     Diagnostic Impression - Plan:     Chronic paranoid schizophrenia    Plan:individual psychotherapy and medication management by physician  He presents discharge paperwork from the hospital he left today.  He states that his medication was not called into a pharmacy and he did not get any prescriptions.  He has an appointment scheduled with Dr. Murphy and he will see if Dr. Norman can help him with medications.    Return to Clinic: as scheduled    Length of Service (minutes): 45

## 2017-10-02 NOTE — LETTER
October 3, 2017        Diogo Norman MD  [address] [place] [address]             [facility] - Psychiatry  [address] [place] [address]  Phone: [phone]  Fax: [phone]   Patient: Marshall Rivero   MR Number: [id number]   YOB: 1955   Date of Visit: 10/2/2017       Dear Dr. Norman:    Thank you for referring Marshall Rivero to me for evaluation. Below are the relevant portions of my assessment and plan of care.            If you have questions, please do not hesitate to call me. I look forward to following Marshall along with you.    Sincerely,      Garo Mares, KYLEE           CC  No Recipients

## 2017-10-16 ENCOUNTER — OFFICE VISIT (OUTPATIENT)
Dept: FAMILY MEDICINE | Facility: CLINIC | Age: 62
End: 2017-10-16
Payer: MEDICARE

## 2017-10-16 VITALS
HEIGHT: 68 IN | SYSTOLIC BLOOD PRESSURE: 124 MMHG | WEIGHT: 226.88 LBS | HEART RATE: 86 BPM | RESPIRATION RATE: 18 BRPM | BODY MASS INDEX: 34.38 KG/M2 | TEMPERATURE: 97 F | OXYGEN SATURATION: 98 % | DIASTOLIC BLOOD PRESSURE: 74 MMHG

## 2017-10-16 DIAGNOSIS — E78.00 HYPERCHOLESTEROLEMIA: ICD-10-CM

## 2017-10-16 DIAGNOSIS — R22.0 SWELLING OF FACE: ICD-10-CM

## 2017-10-16 DIAGNOSIS — Z91.199 NONCOMPLIANCE: ICD-10-CM

## 2017-10-16 DIAGNOSIS — R73.02 IGT (IMPAIRED GLUCOSE TOLERANCE): ICD-10-CM

## 2017-10-16 DIAGNOSIS — I10 ESSENTIAL HYPERTENSION: Primary | ICD-10-CM

## 2017-10-16 DIAGNOSIS — F20.0 PARANOID SCHIZOPHRENIA: ICD-10-CM

## 2017-10-16 PROCEDURE — 99214 OFFICE O/P EST MOD 30 MIN: CPT | Mod: S$PBB,,, | Performed by: INTERNAL MEDICINE

## 2017-10-16 PROCEDURE — 99214 OFFICE O/P EST MOD 30 MIN: CPT | Mod: PBBFAC,PO | Performed by: INTERNAL MEDICINE

## 2017-10-16 PROCEDURE — 99999 PR PBB SHADOW E&M-EST. PATIENT-LVL IV: CPT | Mod: PBBFAC,,, | Performed by: INTERNAL MEDICINE

## 2017-10-16 RX ORDER — HYDROCHLOROTHIAZIDE 12.5 MG/1
CAPSULE ORAL
Refills: 0 | COMMUNITY
Start: 2017-10-02 | End: 2017-10-16 | Stop reason: SDUPTHER

## 2017-10-16 RX ORDER — LOSARTAN POTASSIUM 50 MG/1
TABLET ORAL
Qty: 30 TABLET | Refills: 3 | Status: SHIPPED | OUTPATIENT
Start: 2017-10-16 | End: 2018-01-09 | Stop reason: DRUGHIGH

## 2017-10-16 RX ORDER — HYDROCODONE BITARTRATE AND ACETAMINOPHEN 5; 325 MG/1; MG/1
1 TABLET ORAL
Refills: 0 | COMMUNITY
Start: 2017-10-12 | End: 2017-12-26

## 2017-10-16 RX ORDER — PALIPERIDONE 6 MG/1
TABLET, EXTENDED RELEASE ORAL
Refills: 0 | COMMUNITY
Start: 2017-10-03 | End: 2017-10-16 | Stop reason: SDUPTHER

## 2017-10-16 RX ORDER — PALIPERIDONE PALMITATE 156 MG/ML
INJECTION INTRAMUSCULAR
Refills: 0 | COMMUNITY
Start: 2017-10-03 | End: 2017-10-20 | Stop reason: DRUGHIGH

## 2017-10-16 RX ORDER — PALIPERIDONE 6 MG/1
TABLET, EXTENDED RELEASE ORAL
Qty: 30 TABLET | Refills: 0 | Status: SHIPPED | OUTPATIENT
Start: 2017-10-16 | End: 2017-10-20 | Stop reason: ALTCHOICE

## 2017-10-16 RX ORDER — LOSARTAN POTASSIUM 50 MG/1
TABLET ORAL
Refills: 0 | COMMUNITY
Start: 2017-10-04 | End: 2017-10-16 | Stop reason: SDUPTHER

## 2017-10-16 RX ORDER — AMOXICILLIN 500 MG/1
CAPSULE ORAL
Refills: 0 | COMMUNITY
Start: 2017-10-12 | End: 2017-12-26

## 2017-10-16 RX ORDER — HYDROCHLOROTHIAZIDE 12.5 MG/1
CAPSULE ORAL
Qty: 30 CAPSULE | Refills: 3 | Status: SHIPPED | OUTPATIENT
Start: 2017-10-16 | End: 2017-12-26

## 2017-10-16 RX ORDER — AMLODIPINE BESYLATE 10 MG/1
TABLET ORAL
Refills: 0 | COMMUNITY
Start: 2017-10-02 | End: 2017-10-16 | Stop reason: SDUPTHER

## 2017-10-16 RX ORDER — AMLODIPINE BESYLATE 10 MG/1
TABLET ORAL
Qty: 30 TABLET | Refills: 2 | Status: SHIPPED | OUTPATIENT
Start: 2017-10-16 | End: 2017-12-26

## 2017-10-16 NOTE — PROGRESS NOTES
Subjective:       Patient ID: Marshall Rivero is a 61 y.o. male.    Chief Complaint: Follow-up; Hypertension; Hyperlipidemia; and Schizophrenia  ----------f/u from psychiatric facility in [place]-------on new bp meds and on invega for schizophrenia------  HPI  Review of Systems   Constitutional: Negative for chills and fever.   HENT: Positive for facial swelling.    Respiratory: Negative for apnea, cough, choking, chest tightness, shortness of breath, wheezing and stridor.    Cardiovascular: Negative for chest pain, palpitations and leg swelling.   Gastrointestinal: Negative for abdominal pain, nausea and vomiting.   Genitourinary: Negative for dysuria, frequency and hematuria.   Neurological: Positive for headaches. Negative for seizures, syncope, speech difficulty and numbness.   Psychiatric/Behavioral: Negative for agitation, behavioral problems and confusion.       Objective:      Physical Exam   Constitutional: He is oriented to person, place, and time. He appears well-developed and well-nourished. No distress.   HENT:   Head: Normocephalic and atraumatic.   ?Facial swelling tmj area bilat-not tender-   Eyes: Pupils are equal, round, and reactive to light.   Neck: Normal range of motion. Neck supple. Carotid bruit is not present.   Cardiovascular: Normal rate, regular rhythm, normal heart sounds and intact distal pulses.    Pulmonary/Chest: Effort normal and breath sounds normal. No respiratory distress. He has no wheezes. He has no rales. He exhibits no tenderness.   Abdominal: Soft. Bowel sounds are normal. He exhibits no distension. There is no tenderness. There is no rebound and no guarding.   Musculoskeletal: Normal range of motion. He exhibits no edema or tenderness.   Lymphadenopathy:     He has no cervical adenopathy.   Neurological: He is alert and oriented to person, place, and time. Coordination normal.   Skin: Skin is warm and dry. No rash noted. He is not diaphoretic. No erythema. No pallor.    Psychiatric: He has a normal mood and affect. His behavior is normal. Judgment and thought content normal.   Vitals reviewed.      Assessment:       1. Essential hypertension    2. Paranoid schizophrenia    3. Hypercholesterolemia    4. IGT (impaired glucose tolerance)    5. Noncompliance    6. Swelling of face        Plan:        stable-continue meds.           Notes/labs reviewed.            F/u with psychiatry.          -ent for facial swelling-----      F/u 3 months.

## 2017-10-18 ENCOUNTER — HOSPITAL ENCOUNTER (OUTPATIENT)
Dept: RADIOLOGY | Facility: HOSPITAL | Age: 62
Discharge: HOME | End: 2017-10-18
Attending: ORTHOPAEDIC SURGERY
Payer: MEDICARE

## 2017-10-18 ENCOUNTER — OFFICE VISIT (OUTPATIENT)
Dept: OTOLARYNGOLOGY | Facility: CLINIC | Age: 62
End: 2017-10-18
Payer: MEDICARE

## 2017-10-18 ENCOUNTER — OFFICE VISIT (OUTPATIENT)
Dept: PSYCHIATRY | Facility: CLINIC | Age: 62
End: 2017-10-18
Payer: MEDICARE

## 2017-10-18 VITALS
DIASTOLIC BLOOD PRESSURE: 80 MMHG | BODY MASS INDEX: 35.2 KG/M2 | HEART RATE: 62 BPM | SYSTOLIC BLOOD PRESSURE: 133 MMHG | TEMPERATURE: 98 F | WEIGHT: 231.5 LBS

## 2017-10-18 DIAGNOSIS — F20.0 PARANOID SCHIZOPHRENIA: Primary | ICD-10-CM

## 2017-10-18 DIAGNOSIS — R63.5 WEIGHT GAIN: ICD-10-CM

## 2017-10-18 DIAGNOSIS — K11.1 ENLARGED PAROTID GLAND: ICD-10-CM

## 2017-10-18 DIAGNOSIS — M26.623 BILATERAL TEMPOROMANDIBULAR JOINT PAIN: ICD-10-CM

## 2017-10-18 DIAGNOSIS — K11.1 ENLARGED PAROTID GLAND: Primary | ICD-10-CM

## 2017-10-18 PROCEDURE — 99204 OFFICE O/P NEW MOD 45 MIN: CPT | Mod: S$PBB,,, | Performed by: ORTHOPAEDIC SURGERY

## 2017-10-18 PROCEDURE — 99999 PR PBB SHADOW E&M-EST. PATIENT-LVL III: CPT | Mod: PBBFAC,,, | Performed by: ORTHOPAEDIC SURGERY

## 2017-10-18 PROCEDURE — 99213 OFFICE O/P EST LOW 20 MIN: CPT | Mod: PBBFAC,PO,25 | Performed by: ORTHOPAEDIC SURGERY

## 2017-10-18 PROCEDURE — 90834 PSYTX W PT 45 MINUTES: CPT | Mod: S$PBB,,, | Performed by: SOCIAL WORKER

## 2017-10-18 PROCEDURE — 90834 PSYTX W PT 45 MINUTES: CPT | Mod: PBBFAC,PO | Performed by: SOCIAL WORKER

## 2017-10-18 PROCEDURE — 76536 US EXAM OF HEAD AND NECK: CPT | Mod: TC,PO

## 2017-10-18 PROCEDURE — 76536 US EXAM OF HEAD AND NECK: CPT | Mod: 26,,, | Performed by: RADIOLOGY

## 2017-10-18 RX ORDER — MULTIVITAMIN
1 TABLET ORAL DAILY
COMMUNITY
End: 2019-04-18

## 2017-10-18 NOTE — PROGRESS NOTES
"Individual Psychotherapy (PhD/LCSW)    10/18/2017    Site:  [facility]         Therapeutic Intervention: Met with patient.  Outpatient - Insight oriented psychotherapy 45 min - CPT code 76579    Chief complaint/reason for encounter: anxiety and psychosis     Interval history and content of current session:  Patient presents to ongoing individual therapy due to anxiety and psychosis.  He has been able to get his medication from Dr. Norman.  He is confused about whether he needs to take a shot of Ivega.  He has been taking the medication orally.  He is worried about recent weight gain.  He has recently seen Dr. Franco.  He had an ultrasound on his jaw and head.  He notes that the shock on the ultrasound tech's face was visible.  He thinks that the tech was able to see what "they" planted in him.  He notes that they are trans dimensional beings.  He says he does not know what they look like, but he is able to recognize their voices.  He knows their personalities from listening to them.  He has to wear a baseball helmet at night so they do not "mess" with him.  He again shows marks on his legs where they have inserted things into his bodies.  He goes on to talk about how God created two universes.  He details how some of what he is talking about is referenced in the bible.   He has been living with his friend, Valencia, who is retired.  He notes that she also has poor medical health like him.  He is waiting for an appointment with Dr. Murphy.    Treatment plan:  · Target symptoms: anxiety , psychosis  · Why chosen therapy is appropriate versus another modality: relevant to diagnosis  · Outcome monitoring methods: self-report, observation  · Therapeutic intervention type: insight oriented psychotherapy, supportive psychotherapy, interactive psychotherapy    Risk parameters:  Patient reports no suicidal ideation  Patient reports no homicidal ideation  Patient reports no self-injurious behavior  Patient reports no " violent behavior    Verbal deficits: None    Patient's response to intervention:  The patient's response to intervention is guarded.    Progress toward goals and other mental status changes:  The patient's progress toward goals is limited.    Diagnosis:   Schizophrenia    Plan:  individual psychotherapy and consult psychiatrist for medication evaluation, Dr. Murphy    Return to clinic: as scheduled    Length of Service (minutes): 45

## 2017-10-18 NOTE — PROGRESS NOTES
Subjective:       Patient ID: Marshall Rivero is a 61 y.o. male.    Chief Complaint: Facial Swelling for past month (Feels as though its in relation to Pariperidone)    Patient is a very pleasant 61 year old gentleman here to see me today for the first time for evaluation of enlargement of his parotid glands over the last month.  He says that it has been present over the last month. He has gained about 20# over the last several months.  He has pain over his jaws with chewing, extending to his temple at times.  He says that the enlargement of his parotid glands has not happened in the past, but he has had the pain in the past.  He did see his dentist last week for a dental extraction, and he did discuss his ear pain with the dentist.      Review of Systems   Constitutional: Positive for fatigue. Negative for fever and unexpected weight change.   HENT: Negative for congestion, ear discharge, ear pain, facial swelling, hearing loss, nosebleeds, postnasal drip, rhinorrhea, sinus pressure, sneezing, sore throat, tinnitus, trouble swallowing and voice change.    Eyes: Negative for discharge, redness and itching.   Respiratory: Positive for shortness of breath (with exertion). Negative for cough, choking and wheezing.    Cardiovascular: Negative for chest pain and palpitations.   Gastrointestinal: Negative for abdominal pain.        No reflux.   Musculoskeletal: Negative for neck pain.   Neurological: Negative for dizziness, facial asymmetry, light-headedness and headaches.   Hematological: Negative for adenopathy. Does not bruise/bleed easily.   Psychiatric/Behavioral: Negative for agitation, behavioral problems, confusion and decreased concentration.       Objective:      Physical Exam   Constitutional: He is oriented to person, place, and time. Vital signs are normal. He appears well-developed and well-nourished. No distress.   HENT:   Head: Normocephalic and atraumatic.   Right Ear: Hearing, tympanic membrane, external  ear and ear canal normal.   Left Ear: Hearing, tympanic membrane, external ear and ear canal normal.   Nose: Nose normal. No mucosal edema, rhinorrhea, nasal deformity or septal deviation.   Mouth/Throat: Uvula is midline, oropharynx is clear and moist and mucous membranes are normal. No trismus in the jaw. Normal dentition. No uvula swelling. No oropharyngeal exudate or posterior oropharyngeal edema.   Prominent parotid glands bilaterally, no masses palpated   Eyes: Conjunctivae and EOM are normal. Pupils are equal, round, and reactive to light. Right eye exhibits no chemosis. Left eye exhibits no chemosis. Right conjunctiva is not injected. Left conjunctiva is not injected. No scleral icterus.   Neck: Trachea normal and phonation normal. No tracheal tenderness present. No tracheal deviation present. No thyroid mass and no thyromegaly present.   Cardiovascular: Intact distal pulses.    Pulmonary/Chest: Effort normal. No accessory muscle usage or stridor. No respiratory distress.   Lymphadenopathy:        Head (right side): No submental, no submandibular, no preauricular and no posterior auricular adenopathy present.        Head (left side): No submental, no submandibular, no preauricular and no posterior auricular adenopathy present.     He has no cervical adenopathy.        Right cervical: No superficial cervical and no deep cervical adenopathy present.       Left cervical: No superficial cervical and no deep cervical adenopathy present.   Neurological: He is alert and oriented to person, place, and time. No cranial nerve deficit.   Skin: Skin is warm and dry. No rash noted. No erythema.   Psychiatric: He has a normal mood and affect. His behavior is normal. Thought content normal.       Assessment:       1. Enlarged parotid gland    2. Weight gain    3. Bilateral temporomandibular joint pain        Plan:       1.  Enlarged parotid glands: He has symmetrically enlarged parotid glands, but no obvious masses were  palpated today.  I would recommend an US of the neck to evaluate the size of his glands, and to ensure there are no nodules or masses.   If normal, then no further evaluation is needed at this time.  2.  Weight gain:  He feels that he has had increased weight gain since starting his new medication for schizophrenia.  I encouraged him to discuss with his PCP.  Weight gain may also be making his parotid gland more prominent (thicker subcutaneous tissue).  3.  TMJ:  We had a long discussion regarding the underlying pathology of temporomandibular joint dysfunction (TMD) as the cause of ear pain.  We further discussed conservative measures to treat TMD including avoiding gum and other foods that require lots of chewing, warm compresses, and scheduled antinflammatories.  If the pain persists, the patient will then schedule an appointment with a dentist for further evaluation.

## 2017-10-20 ENCOUNTER — OFFICE VISIT (OUTPATIENT)
Dept: PSYCHIATRY | Facility: CLINIC | Age: 62
End: 2017-10-20
Payer: MEDICARE

## 2017-10-20 DIAGNOSIS — F20.0 PARANOID SCHIZOPHRENIA: Primary | ICD-10-CM

## 2017-10-20 PROCEDURE — 90792 PSYCH DIAG EVAL W/MED SRVCS: CPT | Mod: PBBFAC,PO | Performed by: PSYCHIATRY & NEUROLOGY

## 2017-10-20 PROCEDURE — 90792 PSYCH DIAG EVAL W/MED SRVCS: CPT | Mod: S$PBB,,, | Performed by: PSYCHIATRY & NEUROLOGY

## 2017-10-20 RX ORDER — ASENAPINE 5 MG/1
5 TABLET SUBLINGUAL NIGHTLY
Qty: 30 TABLET | Refills: 1 | Status: SHIPPED | OUTPATIENT
Start: 2017-10-20 | End: 2018-01-02 | Stop reason: DRUGHIGH

## 2017-10-20 NOTE — PROGRESS NOTES
"Outpatient Psychiatry Initial Visit (MD/NP)    10/20/2017    Marshall Rivero, a 61 y.o. male, presenting for initial evaluation visit. Met with patient.    Reason for Encounter: referral for delusions    History of Present Illness: Patient is a 62 y/o M with hx of schizophrenia presents for establishment of care. Has been getting psychotherapy with Garo Suzan, was hospitalized at [facility] in [facility] 2 weeks earlier this year for psych distress at sleep deprivation, "body constantly in pain" related to psychosis. From Mr Suzan's note:     He has "mind control" technology that was put all over his body. He says it has been done for years. He says it is a business & they are making money off of him. They are using invisible cloaking technology unless you have been chemically or electronically altered. They work at keeping him enslaved. There is another race of beings on earth. They are able to move in and out of you. He does not feel he needs counseling. He has been in and out of it for thirty years. He has taken four courses of psychology. He feels that you can't tell people the truth. He feels that people would rather lie. He is trying to stay on his medication. He admits he has been on all kind of medication. He does not want to be walking around like a zombie. He let them experiment in society so he could have the knowledge of what is going on in society. He feels that humans are being preyed upon. He feels it's time for people to understand what's really happening.      Additionally, at this visit reports that he had been sleeping with bubble wrap on his legs and with football helmet on to protect himself against the technology described above. Has had symptoms on/off "almost half my life", especially since '06. Says "they hid the technology from me" with "density shifting science". Relates this to "we have interdimensional travel". Reports beliefs lead to conflicts with others. Denies violence. Hears " "voices - "some of them are good" and "some of them aren't good". Not able to work or keep a job for years. Since out of hospital has been adherent with prescribed medication and overt acting on beliefs has decreased, has slept better, felt less tormented. Believes medication "acclimates you to the 4th dimension" and reduces the voices "but that doesn't solve all the problems", has felt sedated from medication. Hasn't attributed weight gain to meds but says he's gained 21 pounds since hospitalization.     Past Psych Hx: several previous hospitalizations. Reports first care in   - alludes to symptoms that led him to "hold out from getting a job". Brother recommended help. Guarded about past hx and diagnoses, says "you might not be the doctor for me" when questioned about treatment history. Says that he has been "wet with the science", has slept with baseball bats to defend against attacks from human/animal/robot cyborgs. Has taken latuda ("mind-controlling medication")    SocHx: reviewed. From counselor Suzan's hx: Patient's mother, Ronda,  in . She was 49. "They" say it was diabetes. The patient was [age]. She lived in [place]. His mother was a  with the school board system. His father, Itz,  in  from digestive problems. He was 79. He lived in [place]. He was a  with [employer]. His parents were  about forty years. The patient is the youngest of five children, four boys and one girl. His sister, Loly,  in .  She lived in [place]. She was . Her leg had been cut off from diabetes. She was on disability due to diabetes. She a radiation technician in the hospital. Itz is  in . He was  with no children. He taught school. He had a master's degree in physics. He lived in [place]. He was in [facility]. Richard, 65, lives in [place]. He is  with three children. He is retired from [employer]. He is a " ". Lance  in the late 80s. They say he was brain dead. He was a "wild edu." He was single with no children. He was a . He had a master's in education. He was pretty close to this brother. He graduated from [employer] in . He played running back in high school. He played football for one year at [employer]. He started working two jobs. He worked several jobs on campus. 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 He graduated from [employer] back in . He got a degree in business communications. He has a YAS in  from the [employer]. He went out west in . He went to [place]. He wanted to retire there. He likes the climate. 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 The patient worked at the [employer] near [place]. He worked in [place] for while. He last worked in . 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 He did  work. He was  for 12 years to [place] because he is not "online." She was in the network. His daughter, Thai Burden, lives in [place]. She is single. He has been disconnected from her for awhile. She is a . His son, Luis Philip, lives in [place]. He is the . He is single with no children. The patient has a house in [place]. He stays with his friend, Valencia. He has been staying with her since January. He was living on the street off and on. He is Ca[employer]. He likes to do weight lifting. He keeps himself physically active.    MedHx: HTN, hyperlipidemia         Review Of Systems:     GENERAL:  No weight gain or loss  SKIN:  No rashes or lacerations  HEAD:  No headaches  EYES:  No exophthalmos, jaundice or blindness  EARS:  No dizziness, tinnitus or hearing loss  NOSE:  No changes in smell  MOUTH & THROAT:  No " dyskinetic movements or obvious goiter  CHEST:  No shortness of breath, hyperventilation or cough  CARDIOVASCULAR:  No tachycardia or chest pain  ABDOMEN:  No nausea, vomiting, pain, constipation or diarrhea  URINARY:  No frequency, dysuria or sexual dysfunction  ENDOCRINE:  No polydipsia, polyuria  MUSCULOSKELETAL:  No pain or stiffness of the joints  NEUROLOGIC:  No weakness, sensory changes, seizures, confusion, memory loss, tremor or other abnormal movements      Current Evaluation:     Nutritional Screening: Considering the patient's height and weight, medications, medical history and preferences, should a referral be made to the dietitian? no    Constitutional  Vitals:  Most recent vital signs, dated less than 90 days prior to this appointment, were not reviewed.  There were no vitals filed for this visit.     General:  unremarkable, age appropriate     Musculoskeletal  Muscle Strength/Tone:  no tremor, no tic   Gait & Station:  non-ataxic     Psychiatric  Appearance: casually dressed & groomed;   Behavior: calm,   Cooperation: cooperative with assessment  Speech: normal rate, volume, tone  Thought Process: linear, goal-directed  Thought Content: No suicidal or homicidal ideation; no delusions  Affect: normal range  Mood: euthymic  Perceptions: No auditory or visual hallucinations  Level of Consciousness: alert throughout interview  Insight: fair  Cognition: Oriented to person, place, time, & situation  Memory: no apparent deficits to general clinical interview; not formally assessed  Attention/Concentration: no apparent deficits to general clinical interview; not formally assessed  Fund of Knowledge: average by vocabulary/education    Laboratory Data  No visits with results within 1 Month(s) from this visit.   Latest known visit with results is:   Admission on 09/18/2017, Discharged on 09/18/2017   Component Date Value Ref Range Status    WBC 09/18/2017 10.67  3.90 - 12.70 K/uL Final    RBC 09/18/2017 5.03   4.60 - 6.20 M/uL Final    Hemoglobin 09/18/2017 15.1  14.0 - 18.0 g/dL Final    Hematocrit 09/18/2017 44.2  40.0 - 54.0 % Final    MCV 09/18/2017 88  82 - 98 fL Final    MCH 09/18/2017 30.0  27.0 - 31.0 pg Final    MCHC 09/18/2017 34.2  32.0 - 36.0 g/dL Final    RDW 09/18/2017 13.4  11.5 - 14.5 % Final    Platelets 09/18/2017 235  150 - 350 K/uL Final    MPV 09/18/2017 11.1  9.2 - 12.9 fL Final    Gran # 09/18/2017 7.0  1.8 - 7.7 K/uL Final    Lymph # 09/18/2017 2.9  1.0 - 4.8 K/uL Final    Mono # 09/18/2017 0.6  0.3 - 1.0 K/uL Final    Eos # 09/18/2017 0.1  0.0 - 0.5 K/uL Final    Baso # 09/18/2017 0.10  0.00 - 0.20 K/uL Final    Gran% 09/18/2017 66.0  38.0 - 73.0 % Final    Lymph% 09/18/2017 26.8  18.0 - 48.0 % Final    Mono% 09/18/2017 5.6  4.0 - 15.0 % Final    Eosinophil% 09/18/2017 0.7  0.0 - 8.0 % Final    Basophil% 09/18/2017 0.9  0.0 - 1.9 % Final    Differential Method 09/18/2017 Automated   Final    Sodium 09/18/2017 138  136 - 145 mmol/L Final    Potassium 09/18/2017 3.7  3.5 - 5.1 mmol/L Final    Chloride 09/18/2017 102  95 - 110 mmol/L Final    CO2 09/18/2017 26  23 - 29 mmol/L Final    Glucose 09/18/2017 94  70 - 110 mg/dL Final    BUN, Bld 09/18/2017 15  6 - 20 mg/dL Final    Creatinine 09/18/2017 0.9  0.5 - 1.4 mg/dL Final    Calcium 09/18/2017 10.0  8.7 - 10.5 mg/dL Final    Total Protein 09/18/2017 8.2  6.0 - 8.4 g/dL Final    Albumin 09/18/2017 4.3  3.5 - 5.2 g/dL Final    Total Bilirubin 09/18/2017 0.4  0.1 - 1.0 mg/dL Final    Alkaline Phosphatase 09/18/2017 92  55 - 135 U/L Final    AST 09/18/2017 19  10 - 40 U/L Final    ALT 09/18/2017 28  10 - 44 U/L Final    Anion Gap 09/18/2017 10  8 - 16 mmol/L Final    eGFR if African American 09/18/2017 >60  >60 mL/min/1.73 m^2 Final    eGFR if non African American 09/18/2017 >60  >60 mL/min/1.73 m^2 Final    TSH 09/18/2017 0.824  0.400 - 4.000 uIU/mL Final    Specimen UA 09/18/2017 Urine, Clean Catch   Final     Color, UA 09/18/2017 Yellow  Yellow, Straw, Phyllis Final    Appearance, UA 09/18/2017 Clear  Clear Final    pH, UA 09/18/2017 7.0  5.0 - 8.0 Final    Specific Gravity, UA 09/18/2017 1.020  1.005 - 1.030 Final    Protein, UA 09/18/2017 Negative  Negative Final    Glucose, UA 09/18/2017 Negative  Negative Final    Ketones, UA 09/18/2017 Negative  Negative Final    Bilirubin (UA) 09/18/2017 Negative  Negative Final    Occult Blood UA 09/18/2017 Negative  Negative Final    Nitrite, UA 09/18/2017 Negative  Negative Final    Urobilinogen, UA 09/18/2017 Negative  <2.0 EU/dL Final    Leukocytes, UA 09/18/2017 Negative  Negative Final    Benzodiazepines 09/18/2017 Negative   Final    Methadone metabolites 09/18/2017 Negative   Final    Cocaine (Metab.) 09/18/2017 Negative   Final    Opiate Scrn, Ur 09/18/2017 Negative   Final    Barbiturate Screen, Ur 09/18/2017 Negative   Final    Amphetamine Screen, Ur 09/18/2017 Negative   Final    THC 09/18/2017 Negative   Final    Phencyclidine 09/18/2017 Negative   Final    Creatinine, Random Ur 09/18/2017 124.6  23.0 - 375.0 mg/dL Final    Toxicology Information 09/18/2017 SEE COMMENT   Final    Alcohol, Medical, Serum 09/18/2017 <10  <10 mg/dL Final    Acetaminophen (Tylenol), Serum 09/18/2017 <3.0* 10.0 - 20.0 ug/mL Final    Salicylate Lvl 09/18/2017 <5.0* 15.0 - 30.0 mg/dL Final     Medications  Outpatient Encounter Prescriptions as of 10/20/2017   Medication Sig Dispense Refill    amlodipine (NORVASC) 10 MG tablet TAKE 1 TABLET BY MOUTH QHS 30 tablet 2    amoxicillin (AMOXIL) 500 MG capsule TAKE 1 CAPSULE BY MOUTH QID  0    hydrochlorothiazide (MICROZIDE) 12.5 mg capsule TAKE 1 CAPSULE BY MOUTH QAM 30 capsule 3    hydrocodone-acetaminophen 5-325mg (NORCO) 5-325 mg per tablet Take 1 tablet by mouth every 4 to 6 hours as needed.  0    INVEGA SUSTENNA 156 mg/mL Syrg injection INJECT 1 ML ONCE A MONTH ON THE 3RD  0    losartan (COZAAR) 50 MG tablet TAKE 1  TABLET BY MOUTH QAM 30 tablet 3    multivitamin (ONE DAILY MULTIVITAMIN) per tablet Take 1 tablet by mouth once daily.      paliperidone (INVEGA) 6 MG TR24 TAKE 1 TABLET BY MOUTH QAM 30 tablet 0     No facility-administered encounter medications on file as of 10/20/2017.      Assessment - Diagnosis - Goals:     Impression: This 63 y/o M with active delusions that are paranoid and somatic, leading him to feel tormented at times. In past has heard voices. Has very poor insight into illness & rapport/alliance threatened when delusional beliefs are challenged. Symptoms have been modestly improved with antipsychotic but has had significant weight gain with current medication.     schizophrenia    Treatment Goals:  Specify outcomes written in observable, behavioral terms:   Reduce delusions    Treatment Plan/Recommendations:   · Trial of saphris.   · Discussed risks, benefits, and alternatives to treatment plan documented above with patient. I answered all patient questions related to this plan and patient expressed understanding and agreement.   · Ongoing psychotherapy.    Return to Clinic: 2 months    Counseling time: 50 minutes  Total time: 10 minutes    OMAR Fong MD

## 2017-11-02 ENCOUNTER — OFFICE VISIT (OUTPATIENT)
Dept: PSYCHIATRY | Facility: CLINIC | Age: 62
End: 2017-11-02
Payer: MEDICARE

## 2017-11-02 DIAGNOSIS — F20.0 PARANOID SCHIZOPHRENIA: Primary | ICD-10-CM

## 2017-11-02 PROCEDURE — 90834 PSYTX W PT 45 MINUTES: CPT | Mod: S$PBB,,, | Performed by: SOCIAL WORKER

## 2017-11-02 PROCEDURE — 90834 PSYTX W PT 45 MINUTES: CPT | Mod: PBBFAC,PO | Performed by: SOCIAL WORKER

## 2017-11-02 NOTE — PROGRESS NOTES
"Individual Psychotherapy (PhD/LCSW)    11/2/2017    Site:  [place]         Therapeutic Intervention: Met with patient.  Outpatient - Insight oriented psychotherapy 45 min - CPT code 83039    Chief complaint/reason for encounter: anxiety and psychosis     Interval history and content of current session:  Patient presents to ongoing individual therapy due to anxiety and psychosis.  He continues to be concerned about weight gain.  He does feel that the medication change is helping him to lose weight.  However, he does not like the way the medication is making him feel.  He has been taking diet pills.  He is going to the gym on an almost daily basis.  He describes participating in spin class, weight training, and swimming.  He admits that it is sad that he is a [age] year old man who does not have a job or his own place.  He says he is not able to get a traditional job because of "them."  He details that time travel is possible.  He details how he is three parts: machine, person, and alien.  He notes that he will sometimes hear two worms in his stomach talking.  He notes that others are not aware of what is happening.  He feels that the cure for aging has been discovered.  He notes that rich people have their own research department.  He is not sure how he will celebrate his upcoming birthday.  He admits that he would like the traditional cake and ice cream.      Treatment plan:  Target symptoms: anxiety , psychosis  Why chosen therapy is appropriate versus another modality: relevant to diagnosis  Outcome monitoring methods: self-report, observation  Therapeutic intervention type: insight oriented psychotherapy, supportive psychotherapy, interactive psychotherapy     Risk parameters:  Patient reports no suicidal ideation  Patient reports no homicidal ideation  Patient reports no self-injurious behavior  Patient reports no violent behavior     Verbal deficits: None     Patient's response to intervention:  The " patient's response to intervention is guarded.     Progress toward goals and other mental status changes:  The patient's progress toward goals is limited.     Diagnosis:   Schizophrenia     Plan:  individual psychotherapy and consult psychiatrist for medication evaluation, Dr. Murphy     Return to clinic: as scheduled     Length of Service (minutes): 45

## 2017-11-17 ENCOUNTER — OFFICE VISIT (OUTPATIENT)
Dept: PSYCHIATRY | Facility: CLINIC | Age: 62
End: 2017-11-17
Payer: MEDICARE

## 2017-11-17 DIAGNOSIS — F20.0 PARANOID SCHIZOPHRENIA: Primary | ICD-10-CM

## 2017-11-17 PROCEDURE — 90834 PSYTX W PT 45 MINUTES: CPT | Mod: S$PBB,,, | Performed by: SOCIAL WORKER

## 2017-11-17 PROCEDURE — 90834 PSYTX W PT 45 MINUTES: CPT | Mod: PBBFAC,PO | Performed by: SOCIAL WORKER

## 2017-11-17 NOTE — PROGRESS NOTES
"Individual Psychotherapy (PhD/LCSW)    11/17/2017    Site:  [place]         Therapeutic Intervention: Met with patient.  Outpatient - Insight oriented psychotherapy 45 min - CPT code 23855    Chief complaint/reason for encounter: anxiety and psychosis     Interval history and content of current session:  Patient presents to ongoing individual therapy due to anxiety and psychosis.  He limps while walking to session.  He notes that the reason for the limping was that he was "under attack" last night.  He notes that "they" attack him because of the being within him that is good.  He says some people might call the being the Holy Spirit.  He notes that he has wounds from where they have entered his body.  "They" will stop him from moving.  He says it is a form of slavery.  He details that the "kaveh think" society is trying to make money off of him.  He admits that he is only taking half of his medication because he did not like the way it was making him feel.  He is taking over the counter dietary supplements twice a day to help him to lose weight.  He admits he is not happy with his life.  He would like to be more productive.  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  He admits that he used to be more involved with a Yazidi, but he has not gotten involved with a Yazidi here in [place].  He was able to talk to his daughter on the phone last night.  He is estranged from his children due to his previous divorce.  He plans to go to [place] this weekend because it is [employer] homecoming weekend.  He is not sure where he is going to stay, but he may try to contact some fraternity brothers.  He is not sure if he will be able to continue sessions into the new year due to the expense.  He is encouraged to continue with Dr. Murphy for medication management.    Treatment plan:  Target symptoms: anxiety , psychosis  Why chosen therapy is appropriate versus another modality: relevant to " diagnosis  Outcome monitoring methods: self-report, observation  Therapeutic intervention type: insight oriented psychotherapy, supportive psychotherapy, interactive psychotherapy     Risk parameters:  Patient reports no suicidal ideation  Patient reports no homicidal ideation  Patient reports no self-injurious behavior  Patient reports no violent behavior     Verbal deficits: None     Patient's response to intervention:  The patient's response to intervention is guarded.     Progress toward goals and other mental status changes:  The patient's progress toward goals is limited.     Diagnosis:   Schizophrenia     Plan:  individual psychotherapy and consult psychiatrist for medication evaluation, Dr. Murphy     Return to clinic: as scheduled     Length of Service (minutes): 45

## 2017-12-04 PROBLEM — F99 PSYCHIATRIC DIAGNOSIS: Status: RESOLVED | Noted: 2017-07-21 | Resolved: 2017-12-04

## 2017-12-26 ENCOUNTER — OFFICE VISIT (OUTPATIENT)
Dept: FAMILY MEDICINE | Facility: CLINIC | Age: 62
End: 2017-12-26
Payer: MEDICARE

## 2017-12-26 VITALS
RESPIRATION RATE: 18 BRPM | TEMPERATURE: 98 F | BODY MASS INDEX: 35.38 KG/M2 | WEIGHT: 233.44 LBS | HEART RATE: 68 BPM | DIASTOLIC BLOOD PRESSURE: 90 MMHG | SYSTOLIC BLOOD PRESSURE: 152 MMHG | HEIGHT: 68 IN

## 2017-12-26 DIAGNOSIS — R73.02 IGT (IMPAIRED GLUCOSE TOLERANCE): ICD-10-CM

## 2017-12-26 DIAGNOSIS — F20.0 PARANOID SCHIZOPHRENIA: ICD-10-CM

## 2017-12-26 DIAGNOSIS — E78.00 HYPERCHOLESTEROLEMIA: ICD-10-CM

## 2017-12-26 DIAGNOSIS — I10 ESSENTIAL HYPERTENSION: ICD-10-CM

## 2017-12-26 DIAGNOSIS — Z00.00 ENCOUNTER FOR PREVENTIVE HEALTH EXAMINATION: Primary | ICD-10-CM

## 2017-12-26 PROBLEM — Z91.199 NONCOMPLIANCE: Status: RESOLVED | Noted: 2017-09-13 | Resolved: 2017-12-26

## 2017-12-26 PROCEDURE — 99999 PR PBB SHADOW E&M-EST. PATIENT-LVL IV: CPT | Mod: PBBFAC,,, | Performed by: REGISTERED NURSE

## 2017-12-26 PROCEDURE — G0439 PPPS, SUBSEQ VISIT: HCPCS | Mod: S$GLB,,, | Performed by: REGISTERED NURSE

## 2017-12-26 NOTE — PATIENT INSTRUCTIONS
Counseling and Referral of Other Preventative  (Italic type indicates deductible and co-insurance are waived)    Patient Name: Marshall Rivero  Today's Date: 12/26/2017      SERVICE LIMITATIONS RECOMMENDATION    Vaccines    · Pneumococcal (once after 65)    · Influenza (annually)    · Hepatitis B (if medium/high risk)    · Prevnar 13      Hepatitis B medium/high risk factors:       - End-stage renal disease       - Hemophiliacs who received Factor VII or         IX concentrates       - Clients of institutions for the mentally             retarded       - Persons who live in the same house as          a HepB carrier       - Homosexual men       - Illicit injectable drug abusers     Pneumococcal:  Due 1 year after Prevnar-13     Influenza: Refuses     Hepatitis B: n/a     Prevnar 13: Due at age 65    Prostate cancer screening (annually to age 75)     Prostate specific antigen (PSA) Shared decision making with Provider. Sometimes a co-pay may be required if the patient decides to have this test.  7/21/2017    Colorectal cancer screening (to age 75)    · Fecal occult blood test (annual)  · Flexible sigmoidoscopy (5y)  · Screening colonoscopy (10y)  · Barium enema   2016    Diabetes self-management training (no USPSTF recommendations)  Requires referral by treating physician for patient with diabetes or renal disease. 10 hours of initial DSMT sessions of no less than 30 minutes each in a continuous 12-month period. 2 hours of follow-up DSMT in subsequent years.  n/a    Glaucoma screening (no USPSTF recommendation)  Diabetes mellitus, family history   , age 50 or over    American, age 65 or over  Negative    Medical nutrition therapy for diabetes or renal disease (no recommended schedule)  Requires referral by treating physician for patient with diabetes or renal disease or kidney transplant within the past 3 years.  Can be provided in same year as diabetes self-management training (DSMT), and CMS  recommends medical nutrition therapy take place after DSMT. Up to 3 hours for initial year and 2 hours in subsequent years.  n/a    Cardiovascular screening blood tests (every 5 years)  · Fasting lipid panel  Order as a panel if possible  7/21/2017    Diabetes screening tests (at least every 3 years, Medicare covers annually or at 6-month intervals for prediabetic patients)  · Fasting blood sugar (FBS) or glucose tolerance test (GTT)  Patient must be diagnosed with one of the following:       - Hypertension       - Dyslipidemia       - Obesity (BMI 30kg/m2)       - Previous elevated impaired FBS or GTT       ... or any two of the following:       - Overweight (BMI 25 but <30)       - Family history of diabetes       - Age 65 or older       - History of gestational diabetes or birth of baby weighing more than 9 pounds  7/21/2017    Abdominal aortic aneurysm screening (once)  · Sonogram   Limited to patients who meet one of the following criteria:       - Men who are 65-75 years old and have smoked more than 100 cigarette in their lifetime       - Anyone with a family history of abdominal aortic aneurysm       - Anyone recommended for screening by the USPSTF  n/a    HIV screening (annually for increased risk patients)  · HIV-1 and HIV-2 by EIA, or KAVITHA, rapid antibody test or oral mucosa transudate  Patients must be at increased risk for HIV infection per USPSTF guidelines or pregnant. Tests covered annually for patient at increased risk or as requested by the patient. Pregnant patients may receive up to 3 tests during pregnancy.  n/a    Smoking cessation counseling (up to 8 sessions per year)  Patients must be asymptomatic of tobacco-related conditions to receive as a preventative service.  n/a    Subsequent annual wellness visit  At least 12 months since last AWV  Follow-up with Dr. Norman as scheduled on 1/16/2018       The following information is provided to all patients.  This information is to help you find  resources for any of the problems found today that may be affecting your health:                Living healthy guide: [email or web address].[email or web address].[email or web address]      Understanding Diabetes: www.diabetes.org      Eating healthy: www.cdc.gov/healthyweight      CDC home safety checklist: www.cdc.gov/steadi/patient.html      Agency on Aging: www.goea.[place].[facility]      Alcoholics anonymous (AA): www.aa.org      Physical Activity: www.mary.nih.gov/eb0adhw      Tobacco use: www.quitwithusla.org

## 2018-01-02 ENCOUNTER — OFFICE VISIT (OUTPATIENT)
Dept: PSYCHIATRY | Facility: CLINIC | Age: 63
End: 2018-01-02
Payer: MEDICARE

## 2018-01-02 DIAGNOSIS — F20.9 SCHIZOPHRENIA, UNSPECIFIED TYPE: Primary | ICD-10-CM

## 2018-01-02 DIAGNOSIS — F20.0 PARANOID SCHIZOPHRENIA, CHRONIC CONDITION: Primary | ICD-10-CM

## 2018-01-02 DIAGNOSIS — G47.00 INSOMNIA, UNSPECIFIED TYPE: ICD-10-CM

## 2018-01-02 PROCEDURE — 99213 OFFICE O/P EST LOW 20 MIN: CPT | Mod: S$GLB,,, | Performed by: PSYCHIATRY & NEUROLOGY

## 2018-01-02 PROCEDURE — 90834 PSYTX W PT 45 MINUTES: CPT | Mod: S$GLB,,, | Performed by: SOCIAL WORKER

## 2018-01-02 RX ORDER — ASENAPINE MALEATE 2.5 MG/1
2.5 TABLET SUBLINGUAL NIGHTLY
Qty: 30 TABLET | Refills: 2 | Status: SHIPPED | OUTPATIENT
Start: 2018-01-02 | End: 2018-03-14 | Stop reason: SDUPTHER

## 2018-01-02 NOTE — PROGRESS NOTES
"Individual Psychotherapy (PhD/LCSW)    1/2/2018    Site:  [place]         Therapeutic Intervention: Met with patient.  Outpatient - Insight oriented psychotherapy 45 min - CPT code 31447    Chief complaint/reason for encounter: anxiety and psychosis     Interval history and content of current session:  Patient presents to ongoing individual therapy due to anxiety and psychosis.  He has not been to session since 11/17/17.  He is frustrated with limited finances.  He does not like to depend on another person for food.  He continues to live with his female friend who is older than him.  He is aggravated that she snores at night.  They do sleep in the same bed, but the patient denies that they have a romantic relationship.  He is frustrated with the small amount he gets in food stamps.  He admits that he would like to work.  He has decreased the amount of medication he was taking because it was "too much."  He feels like a computer chip has been installed over his left eye.  He admits that he had stopped hearing "the voices" for awhile.  When asked what the voices say, he details that it is like a conversation.  He feels that his admission to the hospital in [place] "messed me up."  He feels he was doing well prior by eating healthy, taking vitamins and exercising.  He is happy to report that he was able to talk to his children for [place].  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  He does not want to continue counseling at this point.  He will continue with Dr. Murphy for medication management.  He is encouraged to call if he would like another appointment.    Treatment plan:  Target symptoms: anxiety , psychosis  Why chosen therapy is appropriate versus another modality: relevant to diagnosis  Outcome monitoring methods: self-report, observation  Therapeutic intervention type: insight oriented psychotherapy, supportive psychotherapy, interactive psychotherapy     Risk " parameters:  Patient reports no suicidal ideation  Patient reports no homicidal ideation  Patient reports no self-injurious behavior  Patient reports no violent behavior     Verbal deficits: None     Patient's response to intervention:  The patient's response to intervention is guarded.     Progress toward goals and other mental status changes:  The patient's progress toward goals is limited.     Diagnosis:   Schizophrenia     Plan:  Medication management with Dr. Murpyh     Return to clinic: as needed     Length of Service (minutes): 45

## 2018-01-02 NOTE — PROGRESS NOTES
"Outpatient Psychiatry Follow-up Visit (MD/NP)    1/2/2018    Marshall Rivero, a 62 y.o. male, presenting for follow-up visit. Met with patient.    Reason for Encounter: referral for delusions    Interval History: Patient seen and interviewed today for follow-up, about 2 months since last visit. Patient reports voices somewhat better and less distressing than previously. Still feels under attack (delusions unchanged) as previously. Reports that he's been taking Saphris, that it's been more sedating than he can regularly tolerate (even at 5 mg at night), so he's been taking about half the time to 4 nights/week, though usually at 2.5 mg. Denies new symptoms since last time. No new health problems. Weight has been stable, though he still thinks a lot about weight gained during previous treatment regimen, quite worried about it resuming. No exercising ("can't afford to go to the gym") or intentionally modifying diet either (also difficult on his budget). Has stopped psychotherapy also due to financial constraints.     Background: Patient is a 62 y/o M with hx of schizophrenia presents for establishment of care. Has been getting psychotherapy with Garo Mares, was [facility] 2 weeks earlier this year for psych distress at sleep deprivation, "body constantly in pain" related to psychosis. From Mr Mares's note: He has "mind control" technology that was put all over his body. He says it has been done for years. He says it is a business & they are making money off of him. They are using invisible cloaking technology unless you have been chemically or electronically altered. They work at keeping him enslaved. There is another race of beings on earth. They are able to move in & out of you. He does not feel he needs counseling. He has been in & out of it for 30 years. He has taken 4 courses of psychology. He feels that you can't tell people the truth. He feels that people would rather lie. He " "is trying to stay on his medication. He admits he has been on all kind of medication. He does not want to be walking around like a zombie. He let them experiment in society so he could have the knowledge of what is going on in society. He feels that humans are being preyed upon. He feels it's time for people to understand what's really happening. Additionally, at this visit reports that he had been sleeping with bubble wrap on his legs & with football helmet on to protect himself against the technology described above. Has had symptoms on/off "almost half my life", especially since . Says "they hid the technology from me" with "density shifting science". Relates this to "we have interdimensional travel". Reports beliefs lead to conflicts with others. Denies violence. Hears voices - "some of them are good" & "some of them aren't good". Not able to work or keep a job for years. Since out of hospital has been adherent with prescribed medication & overt acting on beliefs has decreased, has slept better, felt less tormented. Believes medication "acclimates you to the 4th dimension" & reduces the voices "but that doesn't solve all the problems", has felt sedated from medication. Hasn't attributed weight gain to meds but says he's gained 21 pounds since hospitalization. Past Psych Hx: several previous hospitalizations. Reports 1st care in   - alludes to symptoms that led him to "hold out from getting a job". Brother recommended help. Guarded about past hx & diagnoses, says "you might not be the doctor for me" when questioned about treatment history. Says that he has been "wet with the science", has slept with baseball bats to defend against attacks from human/animal/robot cyborgs. Has taken latuda ("mind-controlling medication"). SocHx: reviewed. From counselor Suzan's hx: Patient's mother, Ronda,  in . She was 49. "They" say it was diabetes. The patient was 21. She lived in [place]. His mother was a " " with the school board system. His father, Itz,  in  from digestive problems. He was 79. He lived in [place]. He was a  with [employer]. His parents were  about forty years. The patient is the youngest of five children, 4 boys & 1 girl. His sister, Loly,  in . She lived in [place]. She was . Her leg had been cut off from diabetes. She was on disability due to diabetes. She a radiation technician in the hospital. Itz is  in . He was  with no children. He taught school. He had a master's degree in physics. He lived in [place]. He was in [facility]. Richard, 65, lives in [place]. He is  with 3 children. He is retired from [employer]. He is a . Lance  in the late 80s. They say he was brain dead. He was a "wild edu." He was single with no children. He was a . He had a master's in education. He was pretty close to this brother. He graduated from [employer] in . He played running back in high school. He played football for one year at [employer]. He started working 2 jobs. He worked several jobs on campus. 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 He graduated from [employer] back in . He got a degree in business communications. He has a YAS in  from [employer]. He went out west in . He went to [place]. He wanted to retire there. He likes the climate. 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   The patient worked at the [employer] near [place]. 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 He last worked in . 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 He did  work. He was  for 12 years to Jane because he is not "online." She was in the network. His daughter, Bertram, " 27, lives in [place]. She is single. He has been disconnected from her for awhile. She is a school-teacher. His son, Luis Philip, lives in [place]. He is the . He is single with no children. The pt has a house in [place]. He stays with his friend, Valencia. He has been staying with her since January. He was living on the street off & on. He is Rastafari. He likes to do weight lifting. He keeps himself physically active. MedHx: HTN, hyperlipidemia         Review Of Systems:     GENERAL:  No weight gain or loss  SKIN:  No rashes or lacerations  HEAD:  No headaches  EYES:  No exophthalmos, jaundice or blindness  EARS:  No dizziness, tinnitus or hearing loss  NOSE:  No changes in smell  MOUTH & THROAT:  No dyskinetic movements or obvious goiter  CHEST:  No shortness of breath, hyperventilation or cough  CARDIOVASCULAR:  No tachycardia or chest pain  ABDOMEN:  No nausea, vomiting, pain, constipation or diarrhea  URINARY:  No frequency, dysuria or sexual dysfunction  ENDOCRINE:  No polydipsia, polyuria  MUSCULOSKELETAL:  No pain or stiffness of the joints  NEUROLOGIC:  No weakness, sensory changes, seizures, confusion, memory loss, tremor or other abnormal movements      Current Evaluation:     Nutritional Screening: Considering the patient's height and weight, medications, medical history and preferences, should a referral be made to the dietitian? no    Constitutional  Vitals:  Most recent vital signs, dated less than 90 days prior to this appointment, were not reviewed.  There were no vitals filed for this visit.     General:  unremarkable, age appropriate     Musculoskeletal  Muscle Strength/Tone:  no tremor, no tic   Gait & Station:  non-ataxic     Psychiatric  Appearance: casually dressed & groomed;   Behavior: calm,   Cooperation: cooperative with assessment  Speech: normal rate, volume, tone  Thought Process: linear, goal-directed  Thought Content: No suicidal or homicidal ideation; no  delusions  Affect: normal range  Mood: euthymic  Perceptions: No auditory or visual hallucinations  Level of Consciousness: alert throughout interview  Insight: fair  Cognition: Oriented to person, place, time, & situation  Memory: no apparent deficits to general clinical interview; not formally assessed  Attention/Concentration: no apparent deficits to general clinical interview; not formally assessed  Fund of Knowledge: average by vocabulary/education    Laboratory Data  No visits with results within 1 Month(s) from this visit.   Latest known visit with results is:   Admission on 09/18/2017, Discharged on 09/18/2017   Component Date Value Ref Range Status    WBC 09/18/2017 10.67  3.90 - 12.70 K/uL Final    RBC 09/18/2017 5.03  4.60 - 6.20 M/uL Final    Hemoglobin 09/18/2017 15.1  14.0 - 18.0 g/dL Final    Hematocrit 09/18/2017 44.2  40.0 - 54.0 % Final    MCV 09/18/2017 88  82 - 98 fL Final    MCH 09/18/2017 30.0  27.0 - 31.0 pg Final    MCHC 09/18/2017 34.2  32.0 - 36.0 g/dL Final    RDW 09/18/2017 13.4  11.5 - 14.5 % Final    Platelets 09/18/2017 235  150 - 350 K/uL Final    MPV 09/18/2017 11.1  9.2 - 12.9 fL Final    Gran # 09/18/2017 7.0  1.8 - 7.7 K/uL Final    Lymph # 09/18/2017 2.9  1.0 - 4.8 K/uL Final    Mono # 09/18/2017 0.6  0.3 - 1.0 K/uL Final    Eos # 09/18/2017 0.1  0.0 - 0.5 K/uL Final    Baso # 09/18/2017 0.10  0.00 - 0.20 K/uL Final    Gran% 09/18/2017 66.0  38.0 - 73.0 % Final    Lymph% 09/18/2017 26.8  18.0 - 48.0 % Final    Mono% 09/18/2017 5.6  4.0 - 15.0 % Final    Eosinophil% 09/18/2017 0.7  0.0 - 8.0 % Final    Basophil% 09/18/2017 0.9  0.0 - 1.9 % Final    Differential Method 09/18/2017 Automated   Final    Sodium 09/18/2017 138  136 - 145 mmol/L Final    Potassium 09/18/2017 3.7  3.5 - 5.1 mmol/L Final    Chloride 09/18/2017 102  95 - 110 mmol/L Final    CO2 09/18/2017 26  23 - 29 mmol/L Final    Glucose 09/18/2017 94  70 - 110 mg/dL Final    BUN, Bld  09/18/2017 15  6 - 20 mg/dL Final    Creatinine 09/18/2017 0.9  0.5 - 1.4 mg/dL Final    Calcium 09/18/2017 10.0  8.7 - 10.5 mg/dL Final    Total Protein 09/18/2017 8.2  6.0 - 8.4 g/dL Final    Albumin 09/18/2017 4.3  3.5 - 5.2 g/dL Final    Total Bilirubin 09/18/2017 0.4  0.1 - 1.0 mg/dL Final    Alkaline Phosphatase 09/18/2017 92  55 - 135 U/L Final    AST 09/18/2017 19  10 - 40 U/L Final    ALT 09/18/2017 28  10 - 44 U/L Final    Anion Gap 09/18/2017 10  8 - 16 mmol/L Final    eGFR if African American 09/18/2017 >60  >60 mL/min/1.73 m^2 Final    eGFR if non African American 09/18/2017 >60  >60 mL/min/1.73 m^2 Final    TSH 09/18/2017 0.824  0.400 - 4.000 uIU/mL Final    Specimen UA 09/18/2017 Urine, Clean Catch   Final    Color, UA 09/18/2017 Yellow  Yellow, Straw, Phyllis Final    Appearance, UA 09/18/2017 Clear  Clear Final    pH, UA 09/18/2017 7.0  5.0 - 8.0 Final    Specific Gravity, UA 09/18/2017 1.020  1.005 - 1.030 Final    Protein, UA 09/18/2017 Negative  Negative Final    Glucose, UA 09/18/2017 Negative  Negative Final    Ketones, UA 09/18/2017 Negative  Negative Final    Bilirubin (UA) 09/18/2017 Negative  Negative Final    Occult Blood UA 09/18/2017 Negative  Negative Final    Nitrite, UA 09/18/2017 Negative  Negative Final    Urobilinogen, UA 09/18/2017 Negative  <2.0 EU/dL Final    Leukocytes, UA 09/18/2017 Negative  Negative Final    Benzodiazepines 09/18/2017 Negative   Final    Methadone metabolites 09/18/2017 Negative   Final    Cocaine (Metab.) 09/18/2017 Negative   Final    Opiate Scrn, Ur 09/18/2017 Negative   Final    Barbiturate Screen, Ur 09/18/2017 Negative   Final    Amphetamine Screen, Ur 09/18/2017 Negative   Final    THC 09/18/2017 Negative   Final    Phencyclidine 09/18/2017 Negative   Final    Creatinine, Random Ur 09/18/2017 124.6  23.0 - 375.0 mg/dL Final    Toxicology Information 09/18/2017 SEE COMMENT   Final    Alcohol, Medical, Serum 09/18/2017  <10  <10 mg/dL Final    Acetaminophen (Tylenol), Serum 09/18/2017 <3.0* 10.0 - 20.0 ug/mL Final    Salicylate Lvl 09/18/2017 <5.0* 15.0 - 30.0 mg/dL Final     Medications  Outpatient Encounter Prescriptions as of 1/2/2018   Medication Sig Dispense Refill    asenapine (SAPHRIS) 5 mg Subl Place 1 tablet (5 mg total) under the tongue every evening. 30 tablet 1    losartan (COZAAR) 50 MG tablet TAKE 1 TABLET BY MOUTH QAM 30 tablet 3    multivitamin (ONE DAILY MULTIVITAMIN) per tablet Take 1 tablet by mouth once daily.       No facility-administered encounter medications on file as of 1/2/2018.      Assessment - Diagnosis - Goals:     Impression: This 63 y/o M with active delusions that are paranoid and somatic, leading him to feel tormented at times. In past has heard voices. Has very poor insight into illness & rapport/alliance threatened when delusional beliefs are challenged. Symptoms have been modestly improved with risperidone but has had significant weight gain with current medication. Weight stable and hallucinations improved, delusions not improved. Partially adherent to medication.     schizophrenia    Treatment Goals:  Specify outcomes written in observable, behavioral terms:   Reduce delusions    Treatment Plan/Recommendations:   · Continue saphris. Consider fanapt.   · Discussed risks, benefits, and alternatives to treatment plan documented above with patient. I answered all patient questions related to this plan and patient expressed understanding and agreement.   · Ongoing psychotherapy.    Return to Clinic: 2 months    Counseling time: 5 minutes  Total time: 20 minutes    OMAR Fong MD

## 2018-01-07 NOTE — PROGRESS NOTES
"Marshall Rivero presented for a  Medicare AWV and comprehensive Health Risk Assessment today. The following components were reviewed and updated:    · Medical history  · Family History  · Social history  · Allergies and Current Medications  · Health Risk Assessment  · Health Maintenance  · Care Team     ** See Completed Assessments for Annual Wellness Visit within the encounter summary.**       The following assessments were completed:  · Living Situation  · CAGE  · Depression Screening  · Timed Get Up and Go  · Whisper Test  · Cognitive Function Screening  · Nutrition Screening  · ADL Screening  · PAQ Screening    Vitals:    12/26/17 1318   BP: (!) 152/90   Pulse: 68   Resp: 18   Temp: 98.4 °F (36.9 °C)   TempSrc: Oral   Weight: 105.9 kg (233 lb 7.5 oz)   Height: 5' 8" (1.727 m)     Body mass index is 35.5 kg/m².       Physical Exam   Constitutional: He is oriented to person, place, and time. He appears well-developed and well-nourished. No distress.   HENT:   Head: Normocephalic and atraumatic.   Eyes: Conjunctivae and EOM are normal. Pupils are equal, round, and reactive to light. No scleral icterus.   Neck: Normal range of motion. Neck supple.   Cardiovascular: Normal rate, regular rhythm, normal heart sounds and intact distal pulses.  Exam reveals no gallop and no friction rub.    No murmur heard.  Pulmonary/Chest: Effort normal and breath sounds normal. No respiratory distress. He has no wheezes. He exhibits no tenderness.   Neurological: He is alert and oriented to person, place, and time.   Skin: He is not diaphoretic.   Psychiatric: He has a normal mood and affect. He is agitated. He is not aggressive, not slowed, not actively hallucinating and not combative. Thought content is paranoid and delusional. Cognition and memory are normal. He expresses no homicidal and no suicidal ideation. He expresses no suicidal plans and no homicidal plans.   Speech:  Flighty He is attentive.   Vitals reviewed.        Diagnoses " "and health risks identified today and associated recommendations/orders:    1. Encounter for preventive health examination  HRA completed today.      2. Essential hypertension  This problem is not controlled.  Current BP in office today is elevated at 152/90.  Does not check home BP.  Currently on amlodipine 10 mg daily, losartan 50 mg daily with HCTZ 12.5 mg daily as ordered.  He reports may be up due to "increased salt over the holidays".  Also reports weight gain due to Saphris.  Does report exercising 4 to 5 days per week at local gym.  Discussed healthy lifestyle choices, diet, and exercise.  Advised to f/u with PCP to discuss adjustments to treatment plan.      3. Hypercholesterolemia  This problem is stable and controlled.  Not on med at this time.  Discussed diet and exercise.  Followed by PCP.    Component      Latest Ref Rng & Units 7/21/2017   Cholesterol      120 - 199 mg/dL 196   Triglycerides      30 - 150 mg/dL 104   HDL      40 - 75 mg/dL 46   LDL Cholesterol      63.0 - 159.0 mg/dL 129.2   HDL/Chol Ratio      20.0 - 50.0 % 23.5   Total Cholesterol/HDL Ratio      2.0 - 5.0 4.3   Non-HDL Cholesterol      mg/dL 150         4. IGT (impaired glucose tolerance)  This problem is not controlled.  See lab below.  Discussed diet, exercise and weight loss.  Low-carb/starch intake daily.  Followed by PCP.    Component      Latest Ref Rng & Units 7/21/2017   Hemoglobin A1C      4.0 - 5.6 % 5.9 (H)   Estimated Avg Glucose      68 - 131 mg/dL 123       5. Paranoid schizophrenia  Currently under care of Dr. Murphy and Garo Mares, [facility], for diagnosis.  Currently on Saphris as ordered.  Had initial visit with MD on 10/20/2017:    Patient is a 62 y/o M with hx of schizophrenia presents for establishment of care. Has been getting psychotherapy with Garo Mares, was hospitalized at [facility] in [facility] 2 weeks earlier this year for psych distress at sleep deprivation, "body constantly in pain" related " "to psychosis. From Mr Suzan's note: He has "mind control" technology that was put all over his body. He says it has been done for years. He says it is a business & they are making money off of him. They are using invisible cloaking technology unless you have been chemically or electronically altered. They work at keeping him enslaved. There is another race of beings on earth. They are able to move in and out of you. He does not feel he needs counseling. He has been in and out of it for thirty years. He has taken four courses of psychology. He feels that you can't tell people the truth. He feels that people would rather lie. He is trying to stay on his medication. He admits he has been on all kind of medication. He does not want to be walking around like a zombie. He let them experiment in society so he could have the knowledge of what is going on in society. He feels that humans are being preyed upon. He feels it's time for people to understand what's really happening.  Additionally, at this visit reports that he had been sleeping with bubble wrap on his legs and with football helmet on to protect himself against the technology described above. Has had symptoms on/off "almost half my life", especially since '06. Says "they hid the technology from me" with "density shifting science". Relates this to "we have interdimensional travel". Reports beliefs lead to conflicts with others. Denies violence. Hears voices - "some of them are good" and "some of them aren't good". Not able to work or keep a job for years. Since out of hospital has been adherent with prescribed medication and overt acting on beliefs has decreased, has slept better, felt less tormented. Believes medication "acclimates you to the 4th dimension" and reduces the voices "but that doesn't solve all the problems", has felt sedated from medication. Hasn't attributed weight gain to meds but says he's gained 21 pounds since hospitalization. "     Psychiatric  Appearance: casually dressed & groomed;   Behavior: calm,   Cooperation: cooperative with assessment  Speech: normal rate, volume, tone  Thought Process: linear, goal-directed  Thought Content: No suicidal or homicidal ideation; no delusions  Affect: normal range  Mood: euthymic  Perceptions: No auditory or visual hallucinations  Level of Consciousness: alert throughout interview  Insight: fair  Cognition: Oriented to person, place, time, & situation  Memory: no apparent deficits to general clinical interview; not formally assessed  Attention/Concentration: no apparent deficits to general clinical interview; not formally assessed  Fund of Knowledge: average by vocabulary/education    Does have follow-up appointment with MD scheduled on 1/2/2018.          Provided Marshall with a 5-10 year written screening schedule and personal prevention plan. Recommendations were developed using the USPSTF age appropriate recommendations. Education, counseling, and referrals were provided as needed. After Visit Summary printed and given to patient which includes a list of additional screenings\tests needed.        Patient Care Team:  Diogo Norman MD as PCP - General (Internal Medicine)  Prince Murphy MD (Psychiatry)  Garo Mares LCSW as  (Psychiatry)            Cm Zuñiga, ION

## 2018-01-09 ENCOUNTER — CLINICAL SUPPORT (OUTPATIENT)
Dept: FAMILY MEDICINE | Facility: CLINIC | Age: 63
End: 2018-01-09
Payer: MEDICARE

## 2018-01-09 ENCOUNTER — TELEPHONE (OUTPATIENT)
Dept: FAMILY MEDICINE | Facility: CLINIC | Age: 63
End: 2018-01-09

## 2018-01-09 VITALS — DIASTOLIC BLOOD PRESSURE: 90 MMHG | SYSTOLIC BLOOD PRESSURE: 148 MMHG

## 2018-01-09 RX ORDER — LOSARTAN POTASSIUM 100 MG/1
TABLET ORAL
Qty: 30 TABLET | Refills: 6 | Status: SHIPPED | OUTPATIENT
Start: 2018-01-09 | End: 2018-09-10 | Stop reason: SDUPTHER

## 2018-01-09 NOTE — TELEPHONE ENCOUNTER
Increased losartan from 50 mg daily to 100 mg --- take daily in AM as has been.  New RX sent to pharmacy.  Nurse visit 1 to 2 weeks to recheck BP.

## 2018-01-09 NOTE — PROGRESS NOTES
Pt dropped in to have BP check, per A Cothern request. Pt is asymptomatic. Pressure is slightly elevated as documented. Advised him to monitor if can (denies having a machine) and to notify us or go to the ER/[facility] if he began to have any worsening symptoms. Also advised to follow up with PCP as scheduled for next week.

## 2018-01-16 ENCOUNTER — TELEPHONE (OUTPATIENT)
Dept: FAMILY MEDICINE | Facility: CLINIC | Age: 63
End: 2018-01-16

## 2018-01-16 ENCOUNTER — OFFICE VISIT (OUTPATIENT)
Dept: FAMILY MEDICINE | Facility: CLINIC | Age: 63
End: 2018-01-16
Payer: MEDICARE

## 2018-01-16 ENCOUNTER — LAB VISIT (OUTPATIENT)
Dept: LAB | Facility: HOSPITAL | Age: 63
End: 2018-01-16
Payer: MEDICARE

## 2018-01-16 VITALS
WEIGHT: 235.88 LBS | OXYGEN SATURATION: 97 % | SYSTOLIC BLOOD PRESSURE: 138 MMHG | RESPIRATION RATE: 18 BRPM | HEART RATE: 72 BPM | TEMPERATURE: 97 F | DIASTOLIC BLOOD PRESSURE: 86 MMHG | HEIGHT: 68 IN | BODY MASS INDEX: 35.75 KG/M2

## 2018-01-16 DIAGNOSIS — K11.8 NODULE OF PAROTID GLAND: Primary | ICD-10-CM

## 2018-01-16 DIAGNOSIS — E78.00 HYPERCHOLESTEROLEMIA: ICD-10-CM

## 2018-01-16 DIAGNOSIS — I10 ESSENTIAL HYPERTENSION: ICD-10-CM

## 2018-01-16 DIAGNOSIS — R73.02 IGT (IMPAIRED GLUCOSE TOLERANCE): ICD-10-CM

## 2018-01-16 DIAGNOSIS — I10 ESSENTIAL HYPERTENSION: Primary | ICD-10-CM

## 2018-01-16 DIAGNOSIS — F20.0 PARANOID SCHIZOPHRENIA: ICD-10-CM

## 2018-01-16 LAB
ALBUMIN SERPL BCP-MCNC: 4.1 G/DL
ALP SERPL-CCNC: 90 U/L
ALT SERPL W/O P-5'-P-CCNC: 22 U/L
ANION GAP SERPL CALC-SCNC: 9 MMOL/L
AST SERPL-CCNC: 22 U/L
BILIRUB SERPL-MCNC: 0.6 MG/DL
BUN SERPL-MCNC: 16 MG/DL
CALCIUM SERPL-MCNC: 9.4 MG/DL
CHLORIDE SERPL-SCNC: 102 MMOL/L
CO2 SERPL-SCNC: 26 MMOL/L
CREAT SERPL-MCNC: 0.9 MG/DL
EST. GFR  (AFRICAN AMERICAN): >60 ML/MIN/1.73 M^2
EST. GFR  (NON AFRICAN AMERICAN): >60 ML/MIN/1.73 M^2
ESTIMATED AVG GLUCOSE: 137 MG/DL
GLUCOSE SERPL-MCNC: 113 MG/DL
HBA1C MFR BLD HPLC: 6.4 %
POTASSIUM SERPL-SCNC: 3.9 MMOL/L
PROT SERPL-MCNC: 7.6 G/DL
SODIUM SERPL-SCNC: 137 MMOL/L

## 2018-01-16 PROCEDURE — 83036 HEMOGLOBIN GLYCOSYLATED A1C: CPT

## 2018-01-16 PROCEDURE — 36415 COLL VENOUS BLD VENIPUNCTURE: CPT | Mod: PO

## 2018-01-16 PROCEDURE — 80053 COMPREHEN METABOLIC PANEL: CPT

## 2018-01-16 PROCEDURE — 99214 OFFICE O/P EST MOD 30 MIN: CPT | Mod: S$GLB,,, | Performed by: INTERNAL MEDICINE

## 2018-01-16 PROCEDURE — 99999 PR PBB SHADOW E&M-EST. PATIENT-LVL III: CPT | Mod: PBBFAC,,, | Performed by: INTERNAL MEDICINE

## 2018-01-16 NOTE — TELEPHONE ENCOUNTER
----- Message from Reyna Franco MD sent at 1/16/2018  1:37 PM CST -----  Regarding: RE: u/s parotids  Overall US was normal other than 1 small node- could repeat at six months and as long as stable or smaller no other workup is needed.  Thanks!    ----- Message -----  From: Diogo Norman MD  Sent: 1/16/2018   1:27 PM  To: Reyna Franco MD  Subject: u/s parotids                                     ? Needs any f/u for parotid glands? Thanks, lazaro

## 2018-01-16 NOTE — PROGRESS NOTES
Subjective:       Patient ID: Marshall Rivero is a 62 y.o. male.    Chief Complaint: Follow-up; Hypertension; Hyperlipidemia; and Schizophrenia    Hypertension   This is a chronic problem. The problem is controlled. Pertinent negatives include no chest pain, headaches, neck pain, palpitations or shortness of breath.   Hyperlipidemia   Pertinent negatives include no chest pain, myalgias or shortness of breath. Current antihyperlipidemic treatment includes exercise and diet change.     Review of Systems   Constitutional: Negative for activity change, appetite change, chills, diaphoresis, fatigue, fever and unexpected weight change.   HENT: Negative for drooling, ear discharge, ear pain, facial swelling, hearing loss, mouth sores, nosebleeds, postnasal drip, rhinorrhea, sinus pressure, sneezing, sore throat, tinnitus, trouble swallowing and voice change.    Respiratory: Negative for apnea, cough, choking, chest tightness, shortness of breath and wheezing.    Cardiovascular: Negative for chest pain, palpitations and leg swelling.   Gastrointestinal: Negative for abdominal distention, abdominal pain, anal bleeding, blood in stool, constipation, diarrhea, nausea, rectal pain and vomiting.   Endocrine: Negative for cold intolerance, heat intolerance, polydipsia, polyphagia and polyuria.   Genitourinary: Negative for difficulty urinating, dysuria, enuresis, flank pain, frequency, genital sores, hematuria and urgency.   Musculoskeletal: Negative for arthralgias, back pain, gait problem, joint swelling, myalgias, neck pain and neck stiffness.   Skin: Negative for color change, pallor, rash and wound.   Allergic/Immunologic: Negative for food allergies and immunocompromised state.   Neurological: Negative for dizziness, tremors, seizures, syncope, facial asymmetry, speech difficulty, weakness, light-headedness, numbness and headaches.   Hematological: Negative for adenopathy. Does not bruise/bleed easily.       Objective:       Physical Exam   Constitutional: He is oriented to person, place, and time. He appears well-developed and well-nourished. No distress.   HENT:   Head: Normocephalic and atraumatic.   Eyes: Pupils are equal, round, and reactive to light.   Neck: Normal range of motion. Neck supple. No JVD present. Carotid bruit is not present. No tracheal deviation present. No thyromegaly present.   Cardiovascular: Normal rate, regular rhythm, normal heart sounds and intact distal pulses.    Pulmonary/Chest: Effort normal and breath sounds normal. No respiratory distress. He has no wheezes. He has no rales. He exhibits no tenderness.   Abdominal: Soft. Bowel sounds are normal. He exhibits no distension. There is no tenderness. There is no rebound and no guarding.   Musculoskeletal: Normal range of motion. He exhibits no edema or tenderness.   Lymphadenopathy:     He has no cervical adenopathy.   Neurological: He is alert and oriented to person, place, and time.   Skin: Skin is warm and dry. No rash noted. He is not diaphoretic. No erythema. No pallor.   Psychiatric: He has a normal mood and affect. His behavior is normal. Judgment and thought content normal.   Nursing note and vitals reviewed.      Assessment:       1. Essential hypertension    2. Hypercholesterolemia    3. IGT (impaired glucose tolerance)    4. Paranoid schizophrenia        Plan:        continue meds.               Notes/labs reviewed                       Check cmp,hga1c.             States up on colon.,                     F/u psych.                    F/u 4 months.

## 2018-01-17 NOTE — TELEPHONE ENCOUNTER
Spoke with pt voiced understanding of results.  States he does not want to do the follow up ultrasound.

## 2018-03-13 NOTE — PROGRESS NOTES
"Outpatient Psychiatry Follow-up Visit (MD/NP)    3/14/2018    Marshall Rivero, a 62 y.o. male, presenting for follow-up visit. Met with patient.    Reason for Encounter: f/u, schizophrenia    Interval History: Patient seen and interviewed today for follow-up, about 2 months since last visit. Patient reports voices intermittently distressing. Still feels under attack (delusions unchanged) as previously. Reports that he's been taking Saphris most days, that sedation is less severe at lower dose (2.5 mg). Denies new symptoms since last time. No new health problems. Insight remains quite poor. Weight has been stable, though he still thinks a lot about weight gained during previous treatment regimen, still worried about it resuming. No exercising ("can't afford to go to the gym") or intentionally modifying diet either (also difficult on his budget). Has stopped psychotherapy also due to financial constraints. Activity level is reduced by limited budget/finances.     Background: Patient is a 60 y/o M with hx of schizophrenia presents for establishment of care. Has been getting psychotherapy with Garo Mares, [facility] 2 weeks earlier this year for psych distress at sleep deprivation, "body constantly in pain" related to psychosis. From Mr Mares's note: He has "mind control" technology that was put all over his body. He says it has been done for years. He says it is a business & they are making money off of him. They're using invisible cloaking technology unless you've been chemically or electronically altered. They work at keeping him enslaved. There is another race of beings on earth. They're able to move in & out of you. He doesn''t feel he needs counseling. He has been in & out of it for 30 years. He has taken 4 courses of psychology. He feels that you can't tell people the truth. He feels that people would rather lie. He is trying to stay on his medication. He admits he has " "been on all kind of medication. He doesn't want to be walking around like a zombie. He let them experiment in society so he could have the knowledge of what is going on in society. He feels that humans are being preyed upon. He feels it's time for people to understand what's really happening. Additionally, at this visit reports that he had been sleeping with bubble wrap on his legs & with football helmet on to protect himself against the technology described above. Has had symptoms on/off "almost half my life", especially since . Says "they hid the technology from me" with "density shifting science". Relates this to "we have interdimensional travel". Reports beliefs lead to conflicts with others. Denies violence. Hears voices - "some of them are good" & "some of them aren't good". Not able to work or keep a job for years. Since out of hospital has been adherent with prescribed medication & overt acting on beliefs has decreased, has slept better, felt less tormented. Believes medication "acclimates you to the 4th dimension" & reduces the voices "but that doesn't solve all the problems", has felt sedated from medication. Hasn't attributed weight gain to meds but says he's gained 21 pounds since hospitalization. Past Psych Hx: several previous hospitalizations. Reports 1st care in   - alludes to symptoms that led him to "hold out from getting a job". Brother recommended help. Guarded about past hx & diagnoses, says "you might not be the doctor for me" when questioned about treatment history. Says that he has been "wet with the science", has slept with baseball bats to defend against attacks from human/animal/robot cyborgs. Has taken latuda ("mind-controlling medication"). SocHx: reviewed. From counselor Suzan's hx: Patient's mother, Ronda,  in . She was 49. "They" say it was diabetes. The patient was 21. She lived in [place]. His mother was a  with the school board system. His father, " "tIz,  in  from digestive problems. He was 79. He lived in [place]. He was a  with [employer]. His parents were  about 40 years. The pt is the youngest of 5 children, 4 boys & 1 girl. His sister, Loly,  in . She lived in [place]. She was . Her leg had been cut off from diabetes. She was on disability due to diabetes. She a radiation technician in the hospital. Itz is  in . He was  with no children. He taught school. He had a master's degree in physics. He lived in [place]. He was in [facility]. Richard, 65, lives in [place]. He is  with 3 children. He is retired from [employer]. He's a [employer]. Lance  in the late 80s. They say he was brain dead. He was a "wild edu." He was single with no children. He was a . He had a master's in education. He was pretty close to this brother. He graduated from [employer] in . He played running back in high school. He played football for one year at [employer]. He started working 2 jobs. He worked several jobs on campus. 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 He graduated from [employer] back in . He got a degree in business [employer]. He has a YAS in  from [employer]. He went out west in . He went to [place]. He wanted to retire there. He likes the climate. 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 The pt worked at the [employer] near [place]. 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 He last worked in . 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 He did  work. He was  for 12 years to Jane because he is not "online." She was in the network. His daughter, Thai Burden, lives in [place]. She is single. He has been disconnected from " her for awhile. She is a school-teacher. His son, Luis Philip, lives in [place]. He is the . He is single with no children. The pt has a house in [place]. He stays with his friend, Valencia. He has been staying with her since January. He was living on the street off & on. He is [employer]. He likes to do weight lifting. He keeps himself physically active. MedHx: HTN, hyperlipidemia     Review Of Systems:     GENERAL:  No weight gain or loss  SKIN:  No rashes or lacerations  HEAD:  No headaches  EYES:  No exophthalmos, jaundice or blindness  EARS:  No dizziness, tinnitus or hearing loss  NOSE:  No changes in smell  MOUTH & THROAT:  No dyskinetic movements or obvious goiter  CHEST:  No shortness of breath, hyperventilation or cough  CARDIOVASCULAR:  No tachycardia or chest pain  ABDOMEN:  No nausea, vomiting, pain, constipation or diarrhea  URINARY:  No frequency, dysuria or sexual dysfunction  ENDOCRINE:  No polydipsia, polyuria  MUSCULOSKELETAL:  No pain or stiffness of the joints  NEUROLOGIC:  No weakness, sensory changes, seizures, confusion, memory loss, tremor or other abnormal movements    Current Evaluation:     Nutritional Screening: Considering the patient's height and weight, medications, medical history and preferences, should a referral be made to the dietitian? no    Constitutional  Vitals:  Most recent vital signs, dated less than 90 days prior to this appointment, were not reviewed.  There were no vitals filed for this visit.     General:  unremarkable, age appropriate     Musculoskeletal  Muscle Strength/Tone:  no tremor, no tic   Gait & Station:  non-ataxic     Psychiatric  Appearance: casually dressed & groomed;   Behavior: calm,   Cooperation: cooperative with assessment  Speech: normal rate, volume, tone  Thought Process: linear, goal-directed  Thought Content: No suicidal or homicidal ideation; +delusions  Affect: normal range  Mood: euthymic  Perceptions: +auditory and visual  hallucinations  Level of Consciousness: alert throughout interview  Insight: limited  Cognition: Oriented to person, place, time, & situation  Memory: no apparent deficits to general clinical interview; not formally assessed  Attention/Concentration: no apparent deficits to general clinical interview; not formally assessed  Fund of Knowledge: average by vocabulary/education    Laboratory Data  No visits with results within 1 Month(s) from this visit.   Latest known visit with results is:   Lab Visit on 01/16/2018   Component Date Value Ref Range Status    Sodium 01/16/2018 137  136 - 145 mmol/L Final    Potassium 01/16/2018 3.9  3.5 - 5.1 mmol/L Final    Chloride 01/16/2018 102  95 - 110 mmol/L Final    CO2 01/16/2018 26  23 - 29 mmol/L Final    Glucose 01/16/2018 113* 70 - 110 mg/dL Final    BUN, Bld 01/16/2018 16  8 - 23 mg/dL Final    Creatinine 01/16/2018 0.9  0.5 - 1.4 mg/dL Final    Calcium 01/16/2018 9.4  8.7 - 10.5 mg/dL Final    Total Protein 01/16/2018 7.6  6.0 - 8.4 g/dL Final    Albumin 01/16/2018 4.1  3.5 - 5.2 g/dL Final    Total Bilirubin 01/16/2018 0.6  0.1 - 1.0 mg/dL Final    Alkaline Phosphatase 01/16/2018 90  55 - 135 U/L Final    AST 01/16/2018 22  10 - 40 U/L Final    ALT 01/16/2018 22  10 - 44 U/L Final    Anion Gap 01/16/2018 9  8 - 16 mmol/L Final    eGFR if African American 01/16/2018 >60.0  >60 mL/min/1.73 m^2 Final    eGFR if non African American 01/16/2018 >60.0  >60 mL/min/1.73 m^2 Final    Hemoglobin A1C 01/16/2018 6.4* 4.0 - 5.6 % Final    Estimated Avg Glucose 01/16/2018 137* 68 - 131 mg/dL Final     Medications  Outpatient Encounter Prescriptions as of 3/14/2018   Medication Sig Dispense Refill    asenapine 2.5 mg Subl Place 2.5 mg under the tongue every evening. 30 tablet 2    losartan (COZAAR) 100 MG tablet TAKE 1 TABLET BY MOUTH QAM 30 tablet 6    multivitamin (ONE DAILY MULTIVITAMIN) per tablet Take 1 tablet by mouth once daily.       No  facility-administered encounter medications on file as of 3/14/2018.      Assessment - Diagnosis - Goals:     Impression: This 63 y/o M with active delusions that are paranoid and somatic, leading him to feel tormented at times. In past has heard voices. Has very poor insight into illness & rapport/alliance threatened when delusional beliefs are challenged. Symptoms modestly improved with risperidone but has had significant weight gain with current medication. Weight stable and hallucinations improved, delusions not improved on saphris though only partially adherent to medication.     schizophrenia    Treatment Goals:  Specify outcomes written in observable, behavioral terms:   Reduce delusions    Treatment Plan/Recommendations:   · Continue saphris. Consider fanapt.   · Discussed risks, benefits, and alternatives to treatment plan documented above with patient. I answered all patient questions related to this plan and patient expressed understanding and agreement.   · Ongoing psychotherapy.    Return to Clinic: 2 months    Counseling time: 5 minutes  Total time: 20 minutes    OMAR Fong MD

## 2018-03-14 ENCOUNTER — OFFICE VISIT (OUTPATIENT)
Dept: PSYCHIATRY | Facility: CLINIC | Age: 63
End: 2018-03-14
Payer: MEDICARE

## 2018-03-14 DIAGNOSIS — F20.3 UNDIFFERENTIATED SCHIZOPHRENIA: Primary | ICD-10-CM

## 2018-03-14 PROCEDURE — 99213 OFFICE O/P EST LOW 20 MIN: CPT | Mod: S$GLB,,, | Performed by: PSYCHIATRY & NEUROLOGY

## 2018-03-14 RX ORDER — ASENAPINE MALEATE 2.5 MG/1
2.5 TABLET SUBLINGUAL NIGHTLY
Qty: 30 TABLET | Refills: 2 | Status: SHIPPED | OUTPATIENT
Start: 2018-03-14 | End: 2019-04-18

## 2018-05-14 ENCOUNTER — TELEPHONE (OUTPATIENT)
Dept: FAMILY MEDICINE | Facility: CLINIC | Age: 63
End: 2018-05-14

## 2018-05-14 NOTE — TELEPHONE ENCOUNTER
----- Message from Allie Cortés sent at 5/14/2018  1:28 PM CDT -----  Contact: [phone]  Returning call, please call back at [phone]. Thx-AH

## 2018-06-04 ENCOUNTER — TELEPHONE (OUTPATIENT)
Dept: FAMILY MEDICINE | Facility: CLINIC | Age: 63
End: 2018-06-04

## 2018-06-04 DIAGNOSIS — H91.90 HEARING LOSS, UNSPECIFIED HEARING LOSS TYPE, UNSPECIFIED LATERALITY: Primary | ICD-10-CM

## 2018-06-04 NOTE — TELEPHONE ENCOUNTER
----- Message from Nikko Bass sent at 6/4/2018  1:18 PM CDT -----  Pt is [facility].      Please call pt back at [phone]

## 2018-06-05 NOTE — TELEPHONE ENCOUNTER
----- Message from Carissa Reyes sent at 6/5/2018  1:35 PM CDT -----  Pt is checking the status of his referral/please call [phone]/ma

## 2018-06-05 NOTE — TELEPHONE ENCOUNTER
Pt requesting ENTreferral so he can get hearing aids. States the name of the facility is the [facility] [phone]

## 2018-06-06 ENCOUNTER — TELEPHONE (OUTPATIENT)
Dept: FAMILY MEDICINE | Facility: CLINIC | Age: 63
End: 2018-06-06

## 2018-06-06 NOTE — TELEPHONE ENCOUNTER
----- Message from Adalgisa Arcos sent at 6/6/2018  1:17 PM CDT -----  Contact: Pt  Pt stated that he's returning nurses call and is requesting a callback..[phone].

## 2018-09-10 RX ORDER — LOSARTAN POTASSIUM 100 MG/1
TABLET ORAL
Qty: 90 TABLET | Refills: 0 | Status: SHIPPED | OUTPATIENT
Start: 2018-09-10 | End: 2018-12-13 | Stop reason: SDUPTHER

## 2018-09-10 RX ORDER — LOSARTAN POTASSIUM 100 MG/1
TABLET ORAL
Qty: 30 TABLET | Refills: 0 | Status: SHIPPED | OUTPATIENT
Start: 2018-09-10 | End: 2018-09-10 | Stop reason: SDUPTHER

## 2018-12-14 RX ORDER — LOSARTAN POTASSIUM 100 MG/1
TABLET ORAL
Qty: 90 TABLET | Refills: 0 | Status: SHIPPED | OUTPATIENT
Start: 2018-12-14 | End: 2019-04-18

## 2018-12-17 NOTE — LETTER
October 18, 2017      Diogo Norman MD  [address] [place] [address]           [facility]  [address]  [place] [address]  Phone: [phone]  Fax: [phone]          Patient: Marshall Rivero   MR Number: [id number]   YOB: 1955   Date of Visit: 10/18/2017       Dear Dr. Diogo Norman:    Thank you for referring Marshall Rivero to me for evaluation. Attached you will find relevant portions of my assessment and plan of care.    If you have questions, please do not hesitate to call me. I look forward to following Marshall Rivero along with you.    Sincerely,    Reyna Franco MD    Enclosure  CC:  No Recipients    If you would like to receive this communication electronically, please contact [email or web address] or [phone] to request more information on [employer] Link access.    For providers and/or their staff who would like to refer a patient to [facility], please contact us through our one-stop-shop provider referral line, [facility] , at [phone].    If you feel you have received this communication in error or would no longer like to receive these types of communications, please e-mail [email or web address]          : Yes

## 2019-02-12 ENCOUNTER — PATIENT OUTREACH (OUTPATIENT)
Dept: ADMINISTRATIVE | Facility: HOSPITAL | Age: 64
End: 2019-02-12

## 2019-04-11 ENCOUNTER — PATIENT MESSAGE (OUTPATIENT)
Dept: ADMINISTRATIVE | Facility: OTHER | Age: 64
End: 2019-04-11

## 2019-04-12 ENCOUNTER — PATIENT MESSAGE (OUTPATIENT)
Dept: PSYCHIATRY | Facility: CLINIC | Age: 64
End: 2019-04-12

## 2019-04-18 ENCOUNTER — OFFICE VISIT (OUTPATIENT)
Dept: FAMILY MEDICINE | Facility: CLINIC | Age: 64
End: 2019-04-18
Payer: MEDICARE

## 2019-04-18 ENCOUNTER — LAB VISIT (OUTPATIENT)
Dept: LAB | Facility: HOSPITAL | Age: 64
End: 2019-04-18
Payer: MEDICARE

## 2019-04-18 VITALS
TEMPERATURE: 98 F | HEART RATE: 72 BPM | OXYGEN SATURATION: 96 % | SYSTOLIC BLOOD PRESSURE: 135 MMHG | DIASTOLIC BLOOD PRESSURE: 84 MMHG | WEIGHT: 228.63 LBS | BODY MASS INDEX: 34.76 KG/M2

## 2019-04-18 DIAGNOSIS — E11.65 TYPE 2 DIABETES MELLITUS WITH HYPERGLYCEMIA, WITHOUT LONG-TERM CURRENT USE OF INSULIN: ICD-10-CM

## 2019-04-18 DIAGNOSIS — I10 ESSENTIAL HYPERTENSION: ICD-10-CM

## 2019-04-18 DIAGNOSIS — E78.00 HYPERCHOLESTEROLEMIA: ICD-10-CM

## 2019-04-18 DIAGNOSIS — F20.0 PARANOID SCHIZOPHRENIA: Primary | ICD-10-CM

## 2019-04-18 DIAGNOSIS — Z12.5 ENCOUNTER FOR PROSTATE CANCER SCREENING: ICD-10-CM

## 2019-04-18 LAB
ALBUMIN SERPL BCP-MCNC: 3.9 G/DL (ref 3.5–5.2)
ALP SERPL-CCNC: 87 U/L (ref 55–135)
ALT SERPL W/O P-5'-P-CCNC: 21 U/L (ref 10–44)
ANION GAP SERPL CALC-SCNC: 10 MMOL/L (ref 8–16)
AST SERPL-CCNC: 17 U/L (ref 10–40)
BASOPHILS # BLD AUTO: 0.05 K/UL (ref 0–0.2)
BASOPHILS NFR BLD: 0.5 % (ref 0–1.9)
BILIRUB SERPL-MCNC: 0.3 MG/DL (ref 0.1–1)
BUN SERPL-MCNC: 19 MG/DL (ref 8–23)
CALCIUM SERPL-MCNC: 9.6 MG/DL (ref 8.7–10.5)
CHLORIDE SERPL-SCNC: 105 MMOL/L (ref 95–110)
CHOLEST SERPL-MCNC: 177 MG/DL (ref 120–199)
CHOLEST/HDLC SERPL: 4.9 {RATIO} (ref 2–5)
CO2 SERPL-SCNC: 25 MMOL/L (ref 23–29)
COMPLEXED PSA SERPL-MCNC: 0.6 NG/ML (ref 0–4)
CREAT SERPL-MCNC: 0.9 MG/DL (ref 0.5–1.4)
DIFFERENTIAL METHOD: ABNORMAL
EOSINOPHIL # BLD AUTO: 0.2 K/UL (ref 0–0.5)
EOSINOPHIL NFR BLD: 1.9 % (ref 0–8)
ERYTHROCYTE [DISTWIDTH] IN BLOOD BY AUTOMATED COUNT: 12.9 % (ref 11.5–14.5)
EST. GFR  (AFRICAN AMERICAN): >60 ML/MIN/1.73 M^2
EST. GFR  (NON AFRICAN AMERICAN): >60 ML/MIN/1.73 M^2
ESTIMATED AVG GLUCOSE: 163 MG/DL (ref 68–131)
GLUCOSE SERPL-MCNC: 120 MG/DL (ref 70–110)
HBA1C MFR BLD HPLC: 7.3 % (ref 4–5.6)
HCT VFR BLD AUTO: 40.2 % (ref 40–54)
HDLC SERPL-MCNC: 36 MG/DL (ref 40–75)
HDLC SERPL: 20.3 % (ref 20–50)
HGB BLD-MCNC: 12.6 G/DL (ref 14–18)
IMM GRANULOCYTES # BLD AUTO: 0.03 K/UL (ref 0–0.04)
IMM GRANULOCYTES NFR BLD AUTO: 0.3 % (ref 0–0.5)
LDLC SERPL CALC-MCNC: 122 MG/DL (ref 63–159)
LYMPHOCYTES # BLD AUTO: 2.6 K/UL (ref 1–4.8)
LYMPHOCYTES NFR BLD: 25.6 % (ref 18–48)
MCH RBC QN AUTO: 28.8 PG (ref 27–31)
MCHC RBC AUTO-ENTMCNC: 31.3 G/DL (ref 32–36)
MCV RBC AUTO: 92 FL (ref 82–98)
MONOCYTES # BLD AUTO: 0.8 K/UL (ref 0.3–1)
MONOCYTES NFR BLD: 7.4 % (ref 4–15)
NEUTROPHILS # BLD AUTO: 6.5 K/UL (ref 1.8–7.7)
NEUTROPHILS NFR BLD: 64.3 % (ref 38–73)
NONHDLC SERPL-MCNC: 141 MG/DL
NRBC BLD-RTO: 0 /100 WBC
PLATELET # BLD AUTO: 273 K/UL (ref 150–350)
PMV BLD AUTO: 11.2 FL (ref 9.2–12.9)
POTASSIUM SERPL-SCNC: 4.2 MMOL/L (ref 3.5–5.1)
PROT SERPL-MCNC: 7.3 G/DL (ref 6–8.4)
RBC # BLD AUTO: 4.38 M/UL (ref 4.6–6.2)
SODIUM SERPL-SCNC: 140 MMOL/L (ref 136–145)
TRIGL SERPL-MCNC: 95 MG/DL (ref 30–150)
WBC # BLD AUTO: 10.12 K/UL (ref 3.9–12.7)

## 2019-04-18 PROCEDURE — 99999 PR PBB SHADOW E&M-EST. PATIENT-LVL III: ICD-10-PCS | Mod: PBBFAC,HCNC,, | Performed by: INTERNAL MEDICINE

## 2019-04-18 PROCEDURE — 3008F PR BODY MASS INDEX (BMI) DOCUMENTED: ICD-10-PCS | Mod: HCNC,CPTII,S$GLB, | Performed by: INTERNAL MEDICINE

## 2019-04-18 PROCEDURE — 99215 OFFICE O/P EST HI 40 MIN: CPT | Mod: HCNC,S$GLB,, | Performed by: INTERNAL MEDICINE

## 2019-04-18 PROCEDURE — 3079F DIAST BP 80-89 MM HG: CPT | Mod: HCNC,CPTII,S$GLB, | Performed by: INTERNAL MEDICINE

## 2019-04-18 PROCEDURE — 80061 LIPID PANEL: CPT | Mod: HCNC

## 2019-04-18 PROCEDURE — 3075F PR MOST RECENT SYSTOLIC BLOOD PRESS GE 130-139MM HG: ICD-10-PCS | Mod: HCNC,CPTII,S$GLB, | Performed by: INTERNAL MEDICINE

## 2019-04-18 PROCEDURE — 84153 ASSAY OF PSA TOTAL: CPT | Mod: HCNC

## 2019-04-18 PROCEDURE — 99999 PR PBB SHADOW E&M-EST. PATIENT-LVL III: CPT | Mod: PBBFAC,HCNC,, | Performed by: INTERNAL MEDICINE

## 2019-04-18 PROCEDURE — 3079F PR MOST RECENT DIASTOLIC BLOOD PRESSURE 80-89 MM HG: ICD-10-PCS | Mod: HCNC,CPTII,S$GLB, | Performed by: INTERNAL MEDICINE

## 2019-04-18 PROCEDURE — 99215 PR OFFICE/OUTPT VISIT, EST, LEVL V, 40-54 MIN: ICD-10-PCS | Mod: HCNC,S$GLB,, | Performed by: INTERNAL MEDICINE

## 2019-04-18 PROCEDURE — 3008F BODY MASS INDEX DOCD: CPT | Mod: HCNC,CPTII,S$GLB, | Performed by: INTERNAL MEDICINE

## 2019-04-18 PROCEDURE — 80053 COMPREHEN METABOLIC PANEL: CPT | Mod: HCNC

## 2019-04-18 PROCEDURE — 36415 COLL VENOUS BLD VENIPUNCTURE: CPT | Mod: HCNC,PO

## 2019-04-18 PROCEDURE — 3075F SYST BP GE 130 - 139MM HG: CPT | Mod: HCNC,CPTII,S$GLB, | Performed by: INTERNAL MEDICINE

## 2019-04-18 PROCEDURE — 85025 COMPLETE CBC W/AUTO DIFF WBC: CPT | Mod: HCNC

## 2019-04-18 PROCEDURE — 83036 HEMOGLOBIN GLYCOSYLATED A1C: CPT | Mod: HCNC

## 2019-04-18 RX ORDER — METFORMIN HYDROCHLORIDE 500 MG/1
500 TABLET ORAL 2 TIMES DAILY WITH MEALS
Qty: 180 TABLET | Refills: 3 | Status: SHIPPED | OUTPATIENT
Start: 2019-04-18 | End: 2020-09-28 | Stop reason: SDUPTHER

## 2019-04-18 RX ORDER — QUETIAPINE FUMARATE 300 MG/1
300 TABLET, FILM COATED ORAL NIGHTLY
Qty: 30 TABLET | Refills: 6 | Status: SHIPPED | OUTPATIENT
Start: 2019-04-18 | End: 2019-10-03 | Stop reason: SDUPTHER

## 2019-04-18 RX ORDER — AMLODIPINE BESYLATE 10 MG/1
10 TABLET ORAL DAILY
Qty: 90 TABLET | Refills: 3 | Status: SHIPPED | OUTPATIENT
Start: 2019-04-18 | End: 2020-03-31

## 2019-04-18 RX ORDER — SERTRALINE HYDROCHLORIDE 100 MG/1
TABLET, FILM COATED ORAL
Qty: 30 TABLET | Refills: 6 | Status: SHIPPED | OUTPATIENT
Start: 2019-04-18 | End: 2019-05-07

## 2019-04-18 RX ORDER — LABETALOL 100 MG/1
100 TABLET, FILM COATED ORAL 2 TIMES DAILY
Qty: 180 TABLET | Refills: 3 | Status: SHIPPED | OUTPATIENT
Start: 2019-04-18 | End: 2020-03-31

## 2019-04-18 RX ORDER — QUETIAPINE FUMARATE 100 MG/1
TABLET, FILM COATED ORAL
Qty: 60 TABLET | Refills: 6 | Status: SHIPPED | OUTPATIENT
Start: 2019-04-18 | End: 2019-10-03 | Stop reason: SDUPTHER

## 2019-04-18 NOTE — PROGRESS NOTES
Subjective:       Patient ID: Marshall Rivero is a 63 y.o. male.    Chief Complaint: Follow-up; Hypertension; Hyperlipidemia; Diabetes; and Schizophrenia    F/u [facility] in [place]------schizophrenia---------------adjusted his meds----------taking meds as listed now-------------and doing well---------    Hypertension   Pertinent negatives include no chest pain, headaches, neck pain, palpitations or shortness of breath.   Hyperlipidemia   Associated symptoms include myalgias. Pertinent negatives include no chest pain or shortness of breath.   Diabetes   Pertinent negatives for hypoglycemia include no dizziness, headaches, pallor, seizures, speech difficulty or tremors. Pertinent negatives for diabetes include no chest pain, no fatigue, no polydipsia, no polyphagia, no polyuria and no weakness.     Past Medical History:   Diagnosis Date    Depression     Hyperlipidemia     Hypertension     Obesity     Paranoia     Schizoaffective disorder      No past surgical history on file.  Family History   Problem Relation Age of Onset    Diabetes Mother     Hypertension Mother     Heart disease Mother     Hyperlipidemia Mother     Arthritis Mother     Diabetes Father     Hypertension Father     Hyperlipidemia Father     Heart disease Father     Arthritis Father     Stroke Neg Hx     Kidney disease Neg Hx     Cancer Neg Hx      Social History     Socioeconomic History    Marital status:      Spouse name: Not on file    Number of children: Not on file    Years of education: Not on file    Highest education level: Not on file   Occupational History    Not on file   Social Needs    Financial resource strain: Not on file    Food insecurity:     Worry: Not on file     Inability: Not on file    Transportation needs:     Medical: Not on file     Non-medical: Not on file   Tobacco Use    Smoking status: Never Smoker    Smokeless tobacco: Never Used   Substance and Sexual Activity    Alcohol use:  Yes     Alcohol/week: 4.8 oz     Types: 2 Cans of beer, 6 Shots of liquor per week    Drug use: No    Sexual activity: Yes   Lifestyle    Physical activity:     Days per week: Not on file     Minutes per session: Not on file    Stress: Not on file   Relationships    Social connections:     Talks on phone: Not on file     Gets together: Not on file     Attends [employer] service: Not on file     Active member of club or organization: Not on file     Attends meetings of clubs or organizations: Not on file     Relationship status: Not on file   Other Topics Concern    Not on file   Social History Narrative    Not on file     Review of Systems   Constitutional: Negative for activity change, appetite change, chills, diaphoresis, fatigue, fever and unexpected weight change.   HENT: Negative for drooling, ear discharge, ear pain, facial swelling, hearing loss, mouth sores, nosebleeds, postnasal drip, rhinorrhea, sinus pressure, sneezing, sore throat, tinnitus, trouble swallowing and voice change.    Eyes: Negative for photophobia, redness and visual disturbance.   Respiratory: Negative for apnea, cough, choking, chest tightness, shortness of breath and wheezing.    Cardiovascular: Negative for chest pain, palpitations and leg swelling.   Gastrointestinal: Negative for abdominal distention, abdominal pain, anal bleeding, blood in stool, constipation, diarrhea, nausea and vomiting.   Endocrine: Negative for cold intolerance, heat intolerance, polydipsia, polyphagia and polyuria.   Genitourinary: Negative for difficulty urinating, dysuria, enuresis, flank pain, frequency, genital sores, hematuria and urgency.   Musculoskeletal: Positive for myalgias. Negative for arthralgias, back pain, gait problem, joint swelling, neck pain and neck stiffness.   Skin: Negative for color change, pallor, rash and wound.   Allergic/Immunologic: Negative for food allergies and immunocompromised state.   Neurological: Negative for  dizziness, tremors, seizures, syncope, facial asymmetry, speech difficulty, weakness, light-headedness, numbness and headaches.   Hematological: Negative for adenopathy. Does not bruise/bleed easily.   Psychiatric/Behavioral: Negative for agitation, decreased concentration, dysphoric mood, self-injury and suicidal ideas. The patient is not hyperactive.        Objective:      Physical Exam   Constitutional: He is oriented to person, place, and time. He appears well-developed and well-nourished. No distress.   HENT:   Head: Normocephalic and atraumatic.   Eyes: Pupils are equal, round, and reactive to light. No scleral icterus.   Neck: Normal range of motion. Neck supple. No JVD present. Carotid bruit is not present. No tracheal deviation present. No thyromegaly present.   Cardiovascular: Normal rate, regular rhythm, normal heart sounds and intact distal pulses.   Pulmonary/Chest: Effort normal and breath sounds normal. No respiratory distress. He has no wheezes. He has no rales. He exhibits no tenderness.   Abdominal: Soft. Bowel sounds are normal. He exhibits no distension. There is no tenderness. There is no rebound and no guarding.   Musculoskeletal: Normal range of motion. He exhibits no edema or tenderness.   Lymphadenopathy:     He has no cervical adenopathy.   Neurological: He is alert and oriented to person, place, and time.   Skin: Skin is warm and dry. No rash noted. He is not diaphoretic. No erythema. No pallor.   Psychiatric: He has a normal mood and affect. His behavior is normal. Judgment and thought content normal.   Nursing note and vitals reviewed.      CMP  Sodium   Date Value Ref Range Status   01/16/2018 137 136 - 145 mmol/L Final     Potassium   Date Value Ref Range Status   01/16/2018 3.9 3.5 - 5.1 mmol/L Final     Chloride   Date Value Ref Range Status   01/16/2018 102 95 - 110 mmol/L Final     CO2   Date Value Ref Range Status   01/16/2018 26 23 - 29 mmol/L Final     Glucose   Date Value Ref  Range Status   01/16/2018 113 (H) 70 - 110 mg/dL Final     BUN, Bld   Date Value Ref Range Status   01/16/2018 16 8 - 23 mg/dL Final     Creatinine   Date Value Ref Range Status   01/16/2018 0.9 0.5 - 1.4 mg/dL Final     Calcium   Date Value Ref Range Status   01/16/2018 9.4 8.7 - 10.5 mg/dL Final     Total Protein   Date Value Ref Range Status   01/16/2018 7.6 6.0 - 8.4 g/dL Final     Albumin   Date Value Ref Range Status   01/16/2018 4.1 3.5 - 5.2 g/dL Final     Total Bilirubin   Date Value Ref Range Status   01/16/2018 0.6 0.1 - 1.0 mg/dL Final     Comment:     For infants and newborns, interpretation of results should be based  on gestational age, weight and in agreement with clinical  observations.  Premature Infant recommended reference ranges:  Up to 24 hours.............<8.0 mg/dL  Up to 48 hours............<12.0 mg/dL  3-5 days..................<15.0 mg/dL  6-29 days.................<15.0 mg/dL       Alkaline Phosphatase   Date Value Ref Range Status   01/16/2018 90 55 - 135 U/L Final     AST   Date Value Ref Range Status   01/16/2018 22 10 - 40 U/L Final     ALT   Date Value Ref Range Status   01/16/2018 22 10 - 44 U/L Final     Anion Gap   Date Value Ref Range Status   01/16/2018 9 8 - 16 mmol/L Final     eGFR if    Date Value Ref Range Status   01/16/2018 >60.0 >60 mL/min/1.73 m^2 Final     eGFR if non    Date Value Ref Range Status   01/16/2018 >60.0 >60 mL/min/1.73 m^2 Final     Comment:     Calculation used to obtain the estimated glomerular filtration  rate (eGFR) is the CKD-EPI equation.        Lab Results   Component Value Date    WBC 10.67 09/18/2017    HGB 15.1 09/18/2017    HCT 44.2 09/18/2017    MCV 88 09/18/2017     09/18/2017     Lab Results   Component Value Date    CHOL 196 07/21/2017     Lab Results   Component Value Date    HDL 46 07/21/2017     Lab Results   Component Value Date    LDLCALC 129.2 07/21/2017     Lab Results   Component Value Date     TRIG 104 07/21/2017     Lab Results   Component Value Date    CHOLHDL 23.5 07/21/2017     Lab Results   Component Value Date    TSH 0.824 09/18/2017     Lab Results   Component Value Date    HGBA1C 6.4 (H) 01/16/2018     Assessment:       1. Paranoid schizophrenia    2. Type 2 diabetes mellitus with hyperglycemia, without long-term current use of insulin    3. Essential hypertension    4. Hypercholesterolemia    5. Encounter for prostate cancer screening        Plan:   Paranoid schizophrenia  -     Ambulatory referral to Psychiatry    Type 2 diabetes mellitus with hyperglycemia, without long-term current use of insulin  -     Hemoglobin A1c; Future; Expected date: 04/18/2019    Essential hypertension  -     Comprehensive metabolic panel; Future; Expected date: 04/18/2019  -     CBC auto differential; Future; Expected date: 04/18/2019    Hypercholesterolemia  -     Lipid panel; Future; Expected date: 04/18/2019    Encounter for prostate cancer screening  -     PSA, Screening; Future; Expected date: 04/18/2019    Other orders  -     amLODIPine (NORVASC) 10 MG tablet; Take 1 tablet (10 mg total) by mouth once daily.  Dispense: 90 tablet; Refill: 3  -     labetalol (NORMODYNE) 100 MG tablet; Take 1 tablet (100 mg total) by mouth 2 (two) times daily.  Dispense: 180 tablet; Refill: 3  -     metFORMIN (GLUCOPHAGE) 500 MG tablet; Take 1 tablet (500 mg total) by mouth 2 (two) times daily with meals.  Dispense: 180 tablet; Refill: 3  -     QUEtiapine (SEROQUEL) 100 MG Tab; One in am and one at noon-----------  Dispense: 60 tablet; Refill: 6  -     QUEtiapine (SEROQUEL) 300 MG Tab; Take 1 tablet (300 mg total) by mouth nightly.  Dispense: 30 tablet; Refill: 6  -     sertraline (ZOLOFT) 100 MG tablet; One in the morning-------  Dispense: 30 tablet; Refill: 6    Stable---------------continue current meds.    F/u psych.              F/u 3 month.s

## 2019-04-22 ENCOUNTER — TELEPHONE (OUTPATIENT)
Dept: FAMILY MEDICINE | Facility: CLINIC | Age: 64
End: 2019-04-22

## 2019-04-22 DIAGNOSIS — D51.9 ANEMIA DUE TO VITAMIN B12 DEFICIENCY, UNSPECIFIED B12 DEFICIENCY TYPE: Primary | ICD-10-CM

## 2019-04-22 RX ORDER — INSULIN PUMP SYRINGE, 3 ML
EACH MISCELLANEOUS
Qty: 1 EACH | Refills: 0 | Status: SHIPPED | OUTPATIENT
Start: 2019-04-22 | End: 2020-05-21

## 2019-04-22 RX ORDER — ISOPROPYL ALCOHOL 70 ML/100ML
1 SWAB TOPICAL
Qty: 200 EACH | Refills: 12 | COMMUNITY
Start: 2019-04-22

## 2019-04-22 RX ORDER — LANCETS
1 EACH MISCELLANEOUS 2 TIMES DAILY PRN
Qty: 200 EACH | Refills: 12 | Status: SHIPPED | OUTPATIENT
Start: 2019-04-22

## 2019-04-22 NOTE — LETTER
April 26, 2019    Marshall Rivero  [address] [address]             [facility]  [address] [address]  Phone: [phone]  Fax: [phone] Dear Mr. Rivero:    We have been trying to contact you regarding lab work. Please give our office a call at your earliest convenience.  .        If you have any questions or concerns, please don't hesitate to call.    Sincerely,        Diogo Norman MD

## 2019-04-25 ENCOUNTER — TELEPHONE (OUTPATIENT)
Dept: FAMILY MEDICINE | Facility: CLINIC | Age: 64
End: 2019-04-25

## 2019-04-25 NOTE — TELEPHONE ENCOUNTER
----- Message from Brenda Wyatt sent at 4/25/2019 10:45 AM CDT -----  Contact: CHRIS WITH [employer]  CALLING FOR  TRUE METRIX  CONTROL  SOLUTION AND ALCOHOL SWABS REQUST FOR PATIENT. PLEASE CALL CHRIS @ [phone]. THANKS, LUIS

## 2019-04-26 DIAGNOSIS — E11.9 TYPE 2 DIABETES MELLITUS WITHOUT COMPLICATION: ICD-10-CM

## 2019-04-29 ENCOUNTER — TELEPHONE (OUTPATIENT)
Dept: FAMILY MEDICINE | Facility: CLINIC | Age: 64
End: 2019-04-29

## 2019-04-29 NOTE — TELEPHONE ENCOUNTER
----- Message from Nikko Bass sent at 4/29/2019  2:45 PM CDT -----  ..Type:  Patient Returning Call    Who Called:pt  Who Left Message for Patient:  Does the patient know what this is regarding?: unknown  Would the patient rather a call back or a response via [facility]? Call back   Best Call Back Number: [phone]  Additional Information: pt is returning a call from nurse please leave detail message.

## 2019-04-29 NOTE — TELEPHONE ENCOUNTER
----- Message from Mann Jordan sent at 4/29/2019 12:10 PM CDT -----  Contact: pt  Pt is returning a call. Pt call back [phone] or [phone] thanks

## 2019-05-02 ENCOUNTER — LAB VISIT (OUTPATIENT)
Dept: LAB | Facility: HOSPITAL | Age: 64
End: 2019-05-02
Attending: INTERNAL MEDICINE
Payer: MEDICARE

## 2019-05-02 DIAGNOSIS — D51.9 ANEMIA DUE TO VITAMIN B12 DEFICIENCY, UNSPECIFIED B12 DEFICIENCY TYPE: ICD-10-CM

## 2019-05-02 LAB
BASOPHILS # BLD AUTO: 0.08 K/UL (ref 0–0.2)
BASOPHILS NFR BLD: 1 % (ref 0–1.9)
DIFFERENTIAL METHOD: ABNORMAL
EOSINOPHIL # BLD AUTO: 0.2 K/UL (ref 0–0.5)
EOSINOPHIL NFR BLD: 2.3 % (ref 0–8)
ERYTHROCYTE [DISTWIDTH] IN BLOOD BY AUTOMATED COUNT: 13.3 % (ref 11.5–14.5)
FERRITIN SERPL-MCNC: 96 NG/ML (ref 20–300)
FOLATE SERPL-MCNC: 5.2 NG/ML (ref 4–24)
HCT VFR BLD AUTO: 39.5 % (ref 40–54)
HGB BLD-MCNC: 12.5 G/DL (ref 14–18)
IMM GRANULOCYTES # BLD AUTO: 0.06 K/UL (ref 0–0.04)
IMM GRANULOCYTES NFR BLD AUTO: 0.7 % (ref 0–0.5)
IRON SERPL-MCNC: 62 UG/DL (ref 45–160)
LYMPHOCYTES # BLD AUTO: 2.5 K/UL (ref 1–4.8)
LYMPHOCYTES NFR BLD: 30.4 % (ref 18–48)
MCH RBC QN AUTO: 29.1 PG (ref 27–31)
MCHC RBC AUTO-ENTMCNC: 31.6 G/DL (ref 32–36)
MCV RBC AUTO: 92 FL (ref 82–98)
MONOCYTES # BLD AUTO: 0.5 K/UL (ref 0.3–1)
MONOCYTES NFR BLD: 6.6 % (ref 4–15)
NEUTROPHILS # BLD AUTO: 4.8 K/UL (ref 1.8–7.7)
NEUTROPHILS NFR BLD: 59 % (ref 38–73)
NRBC BLD-RTO: 0 /100 WBC
PLATELET # BLD AUTO: 288 K/UL (ref 150–350)
PMV BLD AUTO: 11 FL (ref 9.2–12.9)
RBC # BLD AUTO: 4.3 M/UL (ref 4.6–6.2)
SATURATED IRON: 17 % (ref 20–50)
TOTAL IRON BINDING CAPACITY: 361 UG/DL (ref 250–450)
TRANSFERRIN SERPL-MCNC: 244 MG/DL (ref 200–375)
VIT B12 SERPL-MCNC: 345 PG/ML (ref 210–950)
WBC # BLD AUTO: 8.19 K/UL (ref 3.9–12.7)

## 2019-05-02 PROCEDURE — 82728 ASSAY OF FERRITIN: CPT | Mod: HCNC

## 2019-05-02 PROCEDURE — 82607 VITAMIN B-12: CPT | Mod: HCNC

## 2019-05-02 PROCEDURE — 85025 COMPLETE CBC W/AUTO DIFF WBC: CPT | Mod: HCNC

## 2019-05-02 PROCEDURE — 82746 ASSAY OF FOLIC ACID SERUM: CPT | Mod: HCNC

## 2019-05-02 PROCEDURE — 36415 COLL VENOUS BLD VENIPUNCTURE: CPT | Mod: HCNC,PO

## 2019-05-02 PROCEDURE — 83540 ASSAY OF IRON: CPT | Mod: HCNC

## 2019-05-03 ENCOUNTER — PATIENT OUTREACH (OUTPATIENT)
Dept: ADMINISTRATIVE | Facility: HOSPITAL | Age: 64
End: 2019-05-03

## 2019-05-03 ENCOUNTER — TELEPHONE (OUTPATIENT)
Dept: FAMILY MEDICINE | Facility: CLINIC | Age: 64
End: 2019-05-03

## 2019-05-03 DIAGNOSIS — D64.9 ANEMIA, UNSPECIFIED TYPE: Primary | ICD-10-CM

## 2019-05-03 NOTE — PROGRESS NOTES
Pt last A1c 7.3 on last office visit       Fariha ORDONEZ LPN Care Coordinator  Care Coordination Department  [facility]  [phone]

## 2019-05-06 ENCOUNTER — TELEPHONE (OUTPATIENT)
Dept: FAMILY MEDICINE | Facility: CLINIC | Age: 64
End: 2019-05-06

## 2019-05-06 ENCOUNTER — TELEPHONE (OUTPATIENT)
Dept: SURGERY | Facility: CLINIC | Age: 64
End: 2019-05-06

## 2019-05-06 NOTE — TELEPHONE ENCOUNTER
----- Message from Kassy Astorga sent at 5/6/2019  8:56 AM CDT -----  Type:  Patient Returning Call    Who Called: Tim Olivares  Who Left Message for Patient: Dr Norman office//Soco??  Does the patient know what this is regarding?:  Possibly results  Would the patient rather a call back or a response via [facility]?  Call back  Best Call Back Number: [phone]  Or [phone]                                                    Additional Information:  Please call again//silvia/[email or web address]

## 2019-05-07 ENCOUNTER — OFFICE VISIT (OUTPATIENT)
Dept: GASTROENTEROLOGY | Facility: CLINIC | Age: 64
End: 2019-05-07
Payer: MEDICARE

## 2019-05-07 VITALS
HEART RATE: 80 BPM | HEIGHT: 68 IN | SYSTOLIC BLOOD PRESSURE: 148 MMHG | DIASTOLIC BLOOD PRESSURE: 82 MMHG | BODY MASS INDEX: 34.72 KG/M2 | WEIGHT: 229.06 LBS

## 2019-05-07 DIAGNOSIS — R53.83 FATIGUE, UNSPECIFIED TYPE: ICD-10-CM

## 2019-05-07 DIAGNOSIS — D50.0 IRON DEFICIENCY ANEMIA DUE TO CHRONIC BLOOD LOSS: Primary | ICD-10-CM

## 2019-05-07 DIAGNOSIS — Z12.11 COLON CANCER SCREENING: ICD-10-CM

## 2019-05-07 PROCEDURE — 3079F PR MOST RECENT DIASTOLIC BLOOD PRESSURE 80-89 MM HG: ICD-10-PCS | Mod: HCNC,CPTII,S$GLB, | Performed by: NURSE PRACTITIONER

## 2019-05-07 PROCEDURE — 3077F PR MOST RECENT SYSTOLIC BLOOD PRESSURE >= 140 MM HG: ICD-10-PCS | Mod: HCNC,CPTII,S$GLB, | Performed by: NURSE PRACTITIONER

## 2019-05-07 PROCEDURE — 99204 PR OFFICE/OUTPT VISIT, NEW, LEVL IV, 45-59 MIN: ICD-10-PCS | Mod: HCNC,S$GLB,, | Performed by: NURSE PRACTITIONER

## 2019-05-07 PROCEDURE — 99999 PR PBB SHADOW E&M-EST. PATIENT-LVL III: ICD-10-PCS | Mod: PBBFAC,HCNC,, | Performed by: NURSE PRACTITIONER

## 2019-05-07 PROCEDURE — 3077F SYST BP >= 140 MM HG: CPT | Mod: HCNC,CPTII,S$GLB, | Performed by: NURSE PRACTITIONER

## 2019-05-07 PROCEDURE — 3008F PR BODY MASS INDEX (BMI) DOCUMENTED: ICD-10-PCS | Mod: HCNC,CPTII,S$GLB, | Performed by: NURSE PRACTITIONER

## 2019-05-07 PROCEDURE — 99999 PR PBB SHADOW E&M-EST. PATIENT-LVL III: CPT | Mod: PBBFAC,HCNC,, | Performed by: NURSE PRACTITIONER

## 2019-05-07 PROCEDURE — 99204 OFFICE O/P NEW MOD 45 MIN: CPT | Mod: HCNC,S$GLB,, | Performed by: NURSE PRACTITIONER

## 2019-05-07 PROCEDURE — 3079F DIAST BP 80-89 MM HG: CPT | Mod: HCNC,CPTII,S$GLB, | Performed by: NURSE PRACTITIONER

## 2019-05-07 PROCEDURE — 3008F BODY MASS INDEX DOCD: CPT | Mod: HCNC,CPTII,S$GLB, | Performed by: NURSE PRACTITIONER

## 2019-05-07 NOTE — LETTER
May 7, 2019      Diogo Norman MD  [address]  [place] [address]           [facility]  [address]  [place] [address]  Phone: [phone]  Fax: [phone]          Patient: Marshall Rivero   MR Number: [id number]   YOB: 1955   Date of Visit: 5/7/2019       Dear Dr. Diogo Norman:    Thank you for referring Marshall Rivero to me for evaluation. Attached you will find relevant portions of my assessment and plan of care.    If you have questions, please do not hesitate to call me. I look forward to following Marshall Rivero along with you.    Sincerely,    Sydni Raza, [facility]    Enclosure  CC:  No Recipients    If you would like to receive this communication electronically, please contact [email or web address] or [phone] to request more information on [employer] Link access.    For providers and/or their staff who would like to refer a patient to [facility], please contact us through our one-stop-shop provider referral line, [facility], at [phone].    If you feel you have received this communication in error or would no longer like to receive these types of communications, please e-mail [email or web address]

## 2019-05-07 NOTE — PROGRESS NOTES
"Clinic Consult:  [facility] Consultation Note    Reason for Consult:  The primary encounter diagnosis was Iron deficiency anemia due to chronic blood loss. A diagnosis of Fatigue, unspecified type was also pertinent to this visit.    PCP: Diogo Norman   [address] [place] [address]    HPI:  This is a 63 y.o. male here for evaluation of the above  Pt was recently seen by PCP for wellness visit.  At that time, labs were completed and he was found to have a new onset of JOSE MIGUEL.   He denies any previously known JOSE MIGUEL.   He states that 2 weeks ago, he did have 2 episodes of "dark stool" but has not had any since then.   He reports a previous EGD and colonoscopy "years ago", unsure of why this was completed and unsure of results.   He denies any abdominal pain.  No nausea or vomiting.  No change in bowel pattern.  No weight loss.   No NSAIDs.   He has had some recent worsening of fatigue and sluggishness.         Review of Systems   Constitutional: Positive for malaise/fatigue. Negative for chills, fever and weight loss.   Respiratory: Negative for cough.    Cardiovascular: Negative for chest pain.   Gastrointestinal:        Per HPI   Musculoskeletal: Negative for myalgias.   Skin: Negative for itching and rash.   Neurological: Negative for headaches.   Psychiatric/Behavioral: The patient is not nervous/anxious.        Medical History:   Past Medical History:   Diagnosis Date    Depression     Hyperlipidemia     Hypertension     Obesity     Paranoia     Schizoaffective disorder        Surgical History:  History reviewed. No pertinent surgical history.    Family History:   Family History   Problem Relation Age of Onset    Diabetes Mother     Hypertension Mother     Heart disease Mother     Hyperlipidemia Mother     Arthritis Mother     Diabetes Father     Hypertension Father     Hyperlipidemia Father     Heart disease Father     Arthritis Father     Stroke Neg Hx     Kidney " "disease Neg Hx     Cancer Neg Hx        Social History:   Social History     Tobacco Use    Smoking status: Never Smoker    Smokeless tobacco: Never Used   Substance Use Topics    Alcohol use: Yes     Alcohol/week: 4.8 oz     Types: 2 Cans of beer, 6 Shots of liquor per week    Drug use: No       Allergies: Reviewed    Home Medications:   Current Outpatient Medications on File Prior to Visit   Medication Sig Dispense Refill    alcohol swabs (BD ALCOHOL SWABS) PadM Apply 1 each topically as needed. 200 each 12    amLODIPine (NORVASC) 10 MG tablet Take 1 tablet (10 mg total) by mouth once daily. 90 tablet 3    blood sugar diagnostic (BLOOD GLUCOSE TEST) Strp 1 each by Misc.(Non-Drug; Combo Route) route 2 (two) times daily as needed. 200 each 12    blood-glucose meter kit Use as instructed 1 each 0    labetalol (NORMODYNE) 100 MG tablet Take 1 tablet (100 mg total) by mouth 2 (two) times daily. 180 tablet 3    lancets Misc 1 each by Misc.(Non-Drug; Combo Route) route 2 (two) times daily as needed. 200 each 12    metFORMIN (GLUCOPHAGE) 500 MG tablet Take 1 tablet (500 mg total) by mouth 2 (two) times daily with meals. 180 tablet 3    QUEtiapine (SEROQUEL) 100 MG Tab One in am and one at noon----------- 60 tablet 6    QUEtiapine (SEROQUEL) 300 MG Tab Take 1 tablet (300 mg total) by mouth nightly. 30 tablet 6    [DISCONTINUED] sertraline (ZOLOFT) 100 MG tablet One in the morning------- 30 tablet 6     No current facility-administered medications on file prior to visit.        Physical Exam:  Vital Signs:  BP (!) 148/82   Pulse 80   Ht 5' 8" (1.727 m)   Wt 103.9 kg (229 lb 0.9 oz)   BMI 34.83 kg/m²   Body mass index is 34.83 kg/m².  Physical Exam   Constitutional: He is oriented to person, place, and time. He appears well-developed and well-nourished.   HENT:   Head: Normocephalic.   Eyes: No scleral icterus.   Neck: Normal range of motion.   Cardiovascular: Normal rate and regular rhythm. "   Pulmonary/Chest: Effort normal and breath sounds normal.   Abdominal: Soft. Bowel sounds are normal. He exhibits no distension. There is no tenderness.   Musculoskeletal: Normal range of motion.   Neurological: He is alert and oriented to person, place, and time.   Skin: Skin is warm and dry.   Psychiatric: He has a normal mood and affect.   Vitals reviewed.      Labs: Pertinent labs reviewed.    Assessment:  1. Iron deficiency anemia due to chronic blood loss    2. Fatigue, unspecified type         Recommendations:  - discussed the diagnosis of JOSE MIGUEL in relation to GI issues  - will plan for EGD and colonoscopy with miralax  - continue with POC per PCP and evaluation with hematology as previously planned.     Follow up to be determined by results of procedure.        Thank you so much for allowing me to participate in the care of BILL Keita

## 2019-05-08 ENCOUNTER — INITIAL CONSULT (OUTPATIENT)
Dept: HEMATOLOGY/ONCOLOGY | Facility: CLINIC | Age: 64
End: 2019-05-08
Payer: MEDICARE

## 2019-05-08 ENCOUNTER — TELEPHONE (OUTPATIENT)
Dept: HEMATOLOGY/ONCOLOGY | Facility: CLINIC | Age: 64
End: 2019-05-08

## 2019-05-08 VITALS
TEMPERATURE: 98 F | HEART RATE: 67 BPM | RESPIRATION RATE: 20 BRPM | DIASTOLIC BLOOD PRESSURE: 72 MMHG | BODY MASS INDEX: 34.98 KG/M2 | OXYGEN SATURATION: 97 % | WEIGHT: 230.81 LBS | SYSTOLIC BLOOD PRESSURE: 118 MMHG | HEIGHT: 68 IN

## 2019-05-08 DIAGNOSIS — D50.0 IRON DEFICIENCY ANEMIA DUE TO CHRONIC BLOOD LOSS: Primary | ICD-10-CM

## 2019-05-08 PROCEDURE — 3008F PR BODY MASS INDEX (BMI) DOCUMENTED: ICD-10-PCS | Mod: HCNC,CPTII,S$GLB, | Performed by: INTERNAL MEDICINE

## 2019-05-08 PROCEDURE — 99205 PR OFFICE/OUTPT VISIT, NEW, LEVL V, 60-74 MIN: ICD-10-PCS | Mod: HCNC,S$GLB,, | Performed by: INTERNAL MEDICINE

## 2019-05-08 PROCEDURE — 3078F DIAST BP <80 MM HG: CPT | Mod: HCNC,CPTII,S$GLB, | Performed by: INTERNAL MEDICINE

## 2019-05-08 PROCEDURE — 3008F BODY MASS INDEX DOCD: CPT | Mod: HCNC,CPTII,S$GLB, | Performed by: INTERNAL MEDICINE

## 2019-05-08 PROCEDURE — 3074F PR MOST RECENT SYSTOLIC BLOOD PRESSURE < 130 MM HG: ICD-10-PCS | Mod: HCNC,CPTII,S$GLB, | Performed by: INTERNAL MEDICINE

## 2019-05-08 PROCEDURE — 3074F SYST BP LT 130 MM HG: CPT | Mod: HCNC,CPTII,S$GLB, | Performed by: INTERNAL MEDICINE

## 2019-05-08 PROCEDURE — 99999 PR PBB SHADOW E&M-EST. PATIENT-LVL III: ICD-10-PCS | Mod: PBBFAC,HCNC,, | Performed by: INTERNAL MEDICINE

## 2019-05-08 PROCEDURE — 99205 OFFICE O/P NEW HI 60 MIN: CPT | Mod: HCNC,S$GLB,, | Performed by: INTERNAL MEDICINE

## 2019-05-08 PROCEDURE — 3078F PR MOST RECENT DIASTOLIC BLOOD PRESSURE < 80 MM HG: ICD-10-PCS | Mod: HCNC,CPTII,S$GLB, | Performed by: INTERNAL MEDICINE

## 2019-05-08 PROCEDURE — 99999 PR PBB SHADOW E&M-EST. PATIENT-LVL III: CPT | Mod: PBBFAC,HCNC,, | Performed by: INTERNAL MEDICINE

## 2019-05-08 RX ORDER — FERROUS SULFATE 325(65) MG
325 TABLET ORAL DAILY
Qty: 30 TABLET | Refills: 3 | Status: SHIPPED | OUTPATIENT
Start: 2019-05-08 | End: 2019-09-03 | Stop reason: SDUPTHER

## 2019-05-08 NOTE — LETTER
May 8, 2019      Diogo Norman MD  [address] [place] [address]           [facility]  [address]  [place] [address]  Phone: [phone]  Fax: [phone]          Patient: Marshall Rivero   MR Number: [id number]   YOB: 1955   Date of Visit: 5/8/2019       Dear Dr. Diogo Norman:    Thank you for referring Marshall Rivero to me for evaluation. Attached you will find relevant portions of my assessment and plan of care.    If you have questions, please do not hesitate to call me. I look forward to following Marshall Rivero along with you.    Sincerely,    David Dumont MD    Enclosure  CC:  No Recipients    If you would like to receive this communication electronically, please contact [email or web address] or [phone] to request more information on [employer] Link access.    For providers and/or their staff who would like to refer a patient to [facility], please contact us through our one-stop-shop provider referral line, [facility] , at [phone].    If you feel you have received this communication in error or would no longer like to receive these types of communications, please e-mail [email or web address]

## 2019-05-08 NOTE — TELEPHONE ENCOUNTER
Attempted to contact patient about missed appointment today with Dr. Dumont. No answer and no voicemail available.

## 2019-05-08 NOTE — PROGRESS NOTES
Subjective:       Patient ID: Marshall Rivero is a 63 y.o. male.    Chief Complaint: Anemia and Results    HPI 63-year-old male referred for evaluation of iron deficiency anemia patient was seen and evaluated by GI recommended upper lower endoscopy and start on oral iron I was asked to see the patient for further evaluation    Past Medical History:   Diagnosis Date    Depression     Hyperlipidemia     Hypertension     Iron deficiency anemia due to chronic blood loss 5/8/2019    Obesity     Paranoia     Schizoaffective disorder      Family History   Problem Relation Age of Onset    Diabetes Mother     Hypertension Mother     Heart disease Mother     Hyperlipidemia Mother     Arthritis Mother     Diabetes Father     Hypertension Father     Hyperlipidemia Father     Heart disease Father     Arthritis Father     Stroke Neg Hx     Kidney disease Neg Hx     Cancer Neg Hx      Social History     Socioeconomic History    Marital status:      Spouse name: Not on file    Number of children: Not on file    Years of education: Not on file    Highest education level: Not on file   Occupational History    Not on file   Social Needs    Financial resource strain: Not on file    Food insecurity:     Worry: Not on file     Inability: Not on file    Transportation needs:     Medical: Not on file     Non-medical: Not on file   Tobacco Use    Smoking status: Never Smoker    Smokeless tobacco: Never Used   Substance and Sexual Activity    Alcohol use: Yes     Alcohol/week: 4.8 oz     Types: 2 Cans of beer, 6 Shots of liquor per week    Drug use: No    Sexual activity: Yes   Lifestyle    Physical activity:     Days per week: Not on file     Minutes per session: Not on file    Stress: Not on file   Relationships    Social connections:     Talks on phone: Not on file     Gets together: Not on file     Attends Pent[employer]al service: Not on file     Active member of club or organization: Not on file      Attends meetings of clubs or organizations: Not on file     Relationship status: Not on file   Other Topics Concern    Not on file   Social History Narrative    Not on file     History reviewed. No pertinent surgical history.    Labs:  Lab Results   Component Value Date    WBC 8.19 05/02/2019    HGB 12.5 (L) 05/02/2019    HCT 39.5 (L) 05/02/2019    MCV 92 05/02/2019     05/02/2019     BMP  Lab Results   Component Value Date     04/18/2019    K 4.2 04/18/2019     04/18/2019    CO2 25 04/18/2019    BUN 19 04/18/2019    CREATININE 0.9 04/18/2019    CALCIUM 9.6 04/18/2019    ANIONGAP 10 04/18/2019    ESTGFRAFRICA >60.0 04/18/2019    EGFRNONAA >60.0 04/18/2019     Lab Results   Component Value Date    ALT 21 04/18/2019    AST 17 04/18/2019    ALKPHOS 87 04/18/2019    BILITOT 0.3 04/18/2019       Lab Results   Component Value Date    IRON 62 05/02/2019    TIBC 361 05/02/2019    FERRITIN 96 05/02/2019     Lab Results   Component Value Date    KGAKQGEB33 345 05/02/2019     Lab Results   Component Value Date    FOLATE 5.2 05/02/2019     Lab Results   Component Value Date    TSH 0.824 09/18/2017         Review of Systems   Constitutional: Positive for fatigue. Negative for activity change, appetite change, chills, diaphoresis, fever and unexpected weight change.   HENT: Negative for congestion, dental problem, drooling, ear discharge, ear pain, facial swelling, hearing loss, mouth sores, nosebleeds, postnasal drip, rhinorrhea, sinus pressure, sneezing, sore throat, tinnitus, trouble swallowing and voice change.    Eyes: Negative for photophobia, pain, discharge, redness, itching and visual disturbance.   Respiratory: Negative for apnea, cough, choking, chest tightness, shortness of breath, wheezing and stridor.    Cardiovascular: Negative for chest pain, palpitations and leg swelling.   Gastrointestinal: Negative for abdominal distention, abdominal pain, anal bleeding, blood in stool, constipation,  diarrhea, nausea, rectal pain and vomiting.   Endocrine: Negative for cold intolerance, heat intolerance, polydipsia, polyphagia and polyuria.   Genitourinary: Negative for decreased urine volume, difficulty urinating, discharge, dysuria, enuresis, flank pain, frequency, genital sores, hematuria, penile pain, penile swelling, scrotal swelling, testicular pain and urgency.   Musculoskeletal: Negative for arthralgias, back pain, gait problem, joint swelling, myalgias, neck pain and neck stiffness.   Skin: Negative for color change, pallor, rash and wound.   Allergic/Immunologic: Negative for environmental allergies, food allergies and immunocompromised state.   Neurological: Positive for weakness. Negative for dizziness, tremors, seizures, syncope, facial asymmetry, speech difficulty, light-headedness, numbness and headaches.   Hematological: Negative for adenopathy. Does not bruise/bleed easily.   Psychiatric/Behavioral: Positive for dysphoric mood. Negative for agitation, behavioral problems, confusion, decreased concentration, hallucinations, self-injury, sleep disturbance and suicidal ideas. The patient is nervous/anxious. The patient is not hyperactive.        Objective:      Physical Exam   Constitutional: He is oriented to person, place, and time. He appears well-developed and well-nourished. He appears distressed.   HENT:   Head: Normocephalic.   Right Ear: Tympanic membrane and external ear normal.   Left Ear: Tympanic membrane and external ear normal.   Nose: Nose normal. Right sinus exhibits no maxillary sinus tenderness and no frontal sinus tenderness. Left sinus exhibits no maxillary sinus tenderness and no frontal sinus tenderness.   Mouth/Throat: Oropharynx is clear and moist. No oropharyngeal exudate.   Eyes: Pupils are equal, round, and reactive to light. EOM and lids are normal. Right eye exhibits no discharge. Left eye exhibits no discharge. Right conjunctiva is not injected. Right conjunctiva has  no hemorrhage. Left conjunctiva is not injected. Left conjunctiva has no hemorrhage. No scleral icterus. Right eye exhibits normal extraocular motion. Left eye exhibits normal extraocular motion.   Neck: Normal range of motion. Neck supple. No JVD present. No tracheal deviation present. No thyromegaly present.   Cardiovascular: Normal rate, regular rhythm and normal heart sounds.   Pulmonary/Chest: Effort normal and breath sounds normal. No stridor. No respiratory distress.   Abdominal: Soft. Bowel sounds are normal. He exhibits no mass. There is no hepatosplenomegaly, splenomegaly or hepatomegaly. There is no tenderness.   Musculoskeletal: Normal range of motion. He exhibits no edema or tenderness.   Lymphadenopathy:        Head (right side): No posterior auricular and no occipital adenopathy present.        Head (left side): No posterior auricular and no occipital adenopathy present.     He has no cervical adenopathy.        Right cervical: No superficial cervical, no deep cervical and no posterior cervical adenopathy present.       Left cervical: No superficial cervical, no deep cervical and no posterior cervical adenopathy present.     He has no axillary adenopathy.        Right: No supraclavicular adenopathy present.        Left: No supraclavicular adenopathy present.   Neurological: He is alert and oriented to person, place, and time. He has normal strength. No cranial nerve deficit. Coordination normal.   Skin: Skin is dry. No rash noted. He is not diaphoretic. No cyanosis or erythema. Nails show no clubbing.   Psychiatric: He has a normal mood and affect. His behavior is normal. Judgment and thought content normal. Cognition and memory are normal.   Vitals reviewed.          Assessment:      1. Iron deficiency anemia due to chronic blood loss           Plan:     Review GI note from 5 719.  At this point documented iron deficiency anemia reviewed CBCs with patient information from today followed to him both  electronically a hard copy for iron rich foods will start on oral iron supplementation if intolerant will treat with intravenous iron patient reports fatigue and weakness while exercising discussed implications with him proceed with GI evaluation discussed possible cause of GI blood loss including malignancy with patient        David Dumont Jr, MD FACP

## 2019-05-14 ENCOUNTER — PATIENT OUTREACH (OUTPATIENT)
Dept: ADMINISTRATIVE | Facility: HOSPITAL | Age: 64
End: 2019-05-14

## 2019-05-16 ENCOUNTER — OFFICE VISIT (OUTPATIENT)
Dept: OPHTHALMOLOGY | Facility: CLINIC | Age: 64
End: 2019-05-16
Payer: MEDICARE

## 2019-05-16 DIAGNOSIS — H25.13 NUCLEAR SCLEROSIS, BILATERAL: ICD-10-CM

## 2019-05-16 DIAGNOSIS — Z12.12 SCREENING FOR COLORECTAL CANCER: Primary | ICD-10-CM

## 2019-05-16 DIAGNOSIS — E11.9 TYPE 2 DIABETES MELLITUS WITHOUT RETINOPATHY: Primary | ICD-10-CM

## 2019-05-16 DIAGNOSIS — H52.4 HYPEROPIA WITH PRESBYOPIA, BILATERAL: ICD-10-CM

## 2019-05-16 DIAGNOSIS — H25.013 CORTICAL AGE-RELATED CATARACT OF BOTH EYES: ICD-10-CM

## 2019-05-16 DIAGNOSIS — Z12.11 SCREENING FOR COLORECTAL CANCER: Primary | ICD-10-CM

## 2019-05-16 DIAGNOSIS — H52.03 HYPEROPIA WITH PRESBYOPIA, BILATERAL: ICD-10-CM

## 2019-05-16 PROCEDURE — 92015 PR REFRACTION: ICD-10-PCS | Mod: HCNC,S$GLB,, | Performed by: OPTOMETRIST

## 2019-05-16 PROCEDURE — 92004 COMPRE OPH EXAM NEW PT 1/>: CPT | Mod: HCNC,S$GLB,, | Performed by: OPTOMETRIST

## 2019-05-16 PROCEDURE — 99999 PR PBB SHADOW E&M-EST. PATIENT-LVL I: CPT | Mod: PBBFAC,HCNC,, | Performed by: OPTOMETRIST

## 2019-05-16 PROCEDURE — 92004 PR EYE EXAM, NEW PATIENT,COMPREHESV: ICD-10-PCS | Mod: HCNC,S$GLB,, | Performed by: OPTOMETRIST

## 2019-05-16 PROCEDURE — 99999 PR PBB SHADOW E&M-EST. PATIENT-LVL I: ICD-10-PCS | Mod: PBBFAC,HCNC,, | Performed by: OPTOMETRIST

## 2019-05-16 PROCEDURE — 92015 DETERMINE REFRACTIVE STATE: CPT | Mod: HCNC,S$GLB,, | Performed by: OPTOMETRIST

## 2019-05-16 NOTE — PROGRESS NOTES
HPI     Pts last exam was 2 years ago. PT c/o blurred overall va and wears otc   readers  Diabetic eye exam  Diagnosed with diabetes in beginning of 2019  Recent vision fluctuations no  Blood sugar: 7.3  HPI    Any vision changes since last exam: decreased overall va  Eye pain: no  Other ocular symptoms: n    Do you wear currently wear glasses or contacts? otc readers    Interested in contacts today? no    Do you plan on getting new glasses today? If needed          Last edited by Rhonda Carey MA on 5/16/2019 10:21 AM. (History)            Assessment /Plan     For exam results, see Encounter Report.    Type 2 diabetes mellitus without retinopathy  No diabetic retinopathy OD, OS  Continue close care with PCP  Monitor 12 months    Nuclear sclerosis, bilateral  Cortical age-related cataract of both eyes  Surgery is not indicated at this time. Monitor 12 months.    Hyperopia with presbyopia, bilateral  Discussed spec rx with pt  Recommended waiting due to recent bs fluctuations  Ok to c/w OTC readers for now  Offered RTC 1 mo for refraction only, pt declines and would rather c/w OTC readers  He will RTC at later date if desired prior to next dilated exam    RTC 1 yr for dilated eye exam or PRN if any problems.   Discussed above and answered questions.

## 2019-08-20 ENCOUNTER — TELEPHONE (OUTPATIENT)
Dept: ENDOSCOPY | Facility: HOSPITAL | Age: 64
End: 2019-08-20

## 2019-08-20 PROBLEM — E78.5 HYPERLIPEMIA: Status: ACTIVE | Noted: 2017-08-08

## 2019-08-20 NOTE — TELEPHONE ENCOUNTER
The pt called stating that he had a missed call from our number but no message was left. Advised him that I didn't see a telephone note. He verbalized an understanding.

## 2019-09-03 DIAGNOSIS — D50.0 IRON DEFICIENCY ANEMIA DUE TO CHRONIC BLOOD LOSS: ICD-10-CM

## 2019-09-03 RX ORDER — FERROUS SULFATE 325(65) MG
TABLET ORAL
Qty: 30 TABLET | Refills: 0 | Status: SHIPPED | OUTPATIENT
Start: 2019-09-03

## 2019-09-04 ENCOUNTER — TELEPHONE (OUTPATIENT)
Dept: ENDOSCOPY | Facility: HOSPITAL | Age: 64
End: 2019-09-04

## 2019-09-04 NOTE — TELEPHONE ENCOUNTER
Pt left message on [employer]. Retrieved message and attempted to return pt call. Left pt a message to call our office.

## 2019-09-09 ENCOUNTER — TELEPHONE (OUTPATIENT)
Dept: GASTROENTEROLOGY | Facility: CLINIC | Age: 64
End: 2019-09-09

## 2019-09-09 NOTE — TELEPHONE ENCOUNTER
Called patient and patient stated he was returning someone call in the gastro dep. Patient was advised that we did not see a missed telephone note. Patient verbalized understanding.

## 2019-09-09 NOTE — TELEPHONE ENCOUNTER
----- Message from Jojo Schmitz sent at 9/9/2019 12:28 PM CDT -----  Contact: Patient  Type:  Patient Returning Call    Who Called:Patient  Who Left Message for Patient:nurse  Does the patient know what this is regarding?:  Would the patient rather a call back or a response via [facility]? call  Best Call Back Number:[phone]  Additional Information:

## 2019-09-12 ENCOUNTER — ANESTHESIA EVENT (OUTPATIENT)
Dept: ENDOSCOPY | Facility: HOSPITAL | Age: 64
End: 2019-09-12
Payer: MEDICARE

## 2019-09-12 ENCOUNTER — HOSPITAL ENCOUNTER (OUTPATIENT)
Facility: HOSPITAL | Age: 64
Discharge: HOME | End: 2019-09-12
Attending: INTERNAL MEDICINE | Admitting: INTERNAL MEDICINE
Payer: MEDICARE

## 2019-09-12 ENCOUNTER — ANESTHESIA (OUTPATIENT)
Dept: ENDOSCOPY | Facility: HOSPITAL | Age: 64
End: 2019-09-12
Payer: MEDICARE

## 2019-09-12 VITALS
OXYGEN SATURATION: 100 % | RESPIRATION RATE: 18 BRPM | BODY MASS INDEX: 32.91 KG/M2 | WEIGHT: 217.13 LBS | DIASTOLIC BLOOD PRESSURE: 90 MMHG | TEMPERATURE: 98 F | HEIGHT: 68 IN | SYSTOLIC BLOOD PRESSURE: 147 MMHG | HEART RATE: 68 BPM

## 2019-09-12 DIAGNOSIS — K63.5 POLYP OF SIGMOID COLON, UNSPECIFIED TYPE: ICD-10-CM

## 2019-09-12 DIAGNOSIS — D50.0 IRON DEFICIENCY ANEMIA DUE TO CHRONIC BLOOD LOSS: Primary | ICD-10-CM

## 2019-09-12 LAB — POCT GLUCOSE: 124 MG/DL (ref 70–110)

## 2019-09-12 PROCEDURE — 88305 TISSUE SPECIMEN TO PATHOLOGY - SURGERY: ICD-10-PCS | Mod: 26,HCNC,, | Performed by: PATHOLOGY

## 2019-09-12 PROCEDURE — 37000009 HC ANESTHESIA EA ADD 15 MINS: Mod: HCNC | Performed by: INTERNAL MEDICINE

## 2019-09-12 PROCEDURE — 45380 COLONOSCOPY AND BIOPSY: CPT | Mod: HCNC,,, | Performed by: INTERNAL MEDICINE

## 2019-09-12 PROCEDURE — 43239 EGD BIOPSY SINGLE/MULTIPLE: CPT | Mod: HCNC | Performed by: INTERNAL MEDICINE

## 2019-09-12 PROCEDURE — 45380 COLONOSCOPY AND BIOPSY: CPT | Mod: HCNC | Performed by: INTERNAL MEDICINE

## 2019-09-12 PROCEDURE — 43239 EGD BIOPSY SINGLE/MULTIPLE: CPT | Mod: 51,HCNC,, | Performed by: INTERNAL MEDICINE

## 2019-09-12 PROCEDURE — 88305 TISSUE EXAM BY PATHOLOGIST: CPT | Mod: HCNC | Performed by: PATHOLOGY

## 2019-09-12 PROCEDURE — 45380 PR COLONOSCOPY,BIOPSY: ICD-10-PCS | Mod: HCNC,,, | Performed by: INTERNAL MEDICINE

## 2019-09-12 PROCEDURE — 63600175 PHARM REV CODE 636 W HCPCS: Mod: HCNC | Performed by: NURSE ANESTHETIST, CERTIFIED REGISTERED

## 2019-09-12 PROCEDURE — 27201012 HC FORCEPS, HOT/COLD, DISP: Mod: HCNC | Performed by: INTERNAL MEDICINE

## 2019-09-12 PROCEDURE — 88305 TISSUE EXAM BY PATHOLOGIST: CPT | Mod: 26,HCNC,, | Performed by: PATHOLOGY

## 2019-09-12 PROCEDURE — 37000008 HC ANESTHESIA 1ST 15 MINUTES: Mod: HCNC | Performed by: INTERNAL MEDICINE

## 2019-09-12 PROCEDURE — 43239 PR EGD, FLEX, W/BIOPSY, SGL/MULTI: ICD-10-PCS | Mod: 51,HCNC,, | Performed by: INTERNAL MEDICINE

## 2019-09-12 RX ORDER — SODIUM CHLORIDE 0.9 % (FLUSH) 0.9 %
10 SYRINGE (ML) INJECTION
Status: DISCONTINUED | OUTPATIENT
Start: 2019-09-12 | End: 2019-09-12 | Stop reason: HOSPADM

## 2019-09-12 RX ORDER — PROPOFOL 10 MG/ML
VIAL (ML) INTRAVENOUS
Status: DISCONTINUED | OUTPATIENT
Start: 2019-09-12 | End: 2019-09-12

## 2019-09-12 RX ORDER — SODIUM CHLORIDE, SODIUM LACTATE, POTASSIUM CHLORIDE, CALCIUM CHLORIDE 600; 310; 30; 20 MG/100ML; MG/100ML; MG/100ML; MG/100ML
INJECTION, SOLUTION INTRAVENOUS CONTINUOUS PRN
Status: DISCONTINUED | OUTPATIENT
Start: 2019-09-12 | End: 2019-09-12

## 2019-09-12 RX ADMIN — SODIUM CHLORIDE, SODIUM LACTATE, POTASSIUM CHLORIDE, AND CALCIUM CHLORIDE: .6; .31; .03; .02 INJECTION, SOLUTION INTRAVENOUS at 02:09

## 2019-09-12 RX ADMIN — PROPOFOL 740 MG: 10 INJECTION, EMULSION INTRAVENOUS at 03:09

## 2019-09-12 NOTE — ANESTHESIA PREPROCEDURE EVALUATION
09/12/2019  Marshall Rivero is a 63 y.o., male.    Anesthesia Evaluation    I have reviewed the Patient Summary Reports.    I have reviewed the Nursing Notes.   I have reviewed the Medications.     Review of Systems  Anesthesia Hx:  No problems with previous Anesthesia    Social:  Non-Smoker, No Alcohol Use    Hematology/Oncology:  Hematology Normal   Oncology Normal     EENT/Dental:EENT/Dental Normal   Cardiovascular:   Exercise tolerance: good Hypertension, well controlled    Pulmonary:  Pulmonary Normal    Renal/:  Renal/ Normal     Hepatic/GI:  Hepatic/GI Normal Bowel Prep.    Musculoskeletal:  Musculoskeletal Normal    Neurological:  Neurology Normal    Endocrine:   Diabetes, well controlled    Dermatological:  Skin Normal    Psych:   depression Schizoaffective disorder         Physical Exam  General:  Well nourished    Airway/Jaw/Neck:  Airway Findings: Mouth Opening: Normal Tongue: Normal  General Airway Assessment: Adult  Mallampati: II  TM Distance: Normal, at least 6 cm  Jaw/Neck Findings:  Neck ROM: Normal ROM      Dental:  Dental Findings: In tact   Chest/Lungs:  Chest/Lungs Findings: Clear to auscultation, Normal Respiratory Rate     Heart/Vascular:  Heart Findings: Rate: Normal  Rhythm: Regular Rhythm  Sounds: Normal        Mental Status:  Mental Status Findings:  Cooperative, Alert and Oriented         Anesthesia Plan  Type of Anesthesia, risks & benefits discussed:  Anesthesia Type:  MAC  Patient's Preference:   Intra-op Monitoring Plan: standard ASA monitors  Intra-op Monitoring Plan Comments:   Post Op Pain Control Plan:   Post Op Pain Control Plan Comments:   Induction:   IV  Beta Blocker:  Patient is not currently on a Beta-Blocker (No further documentation required).       Informed Consent: Patient understands risks and agrees with Anesthesia plan.  Questions answered. Anesthesia  consent signed with patient.  ASA Score: 2     Day of Surgery Review of History & Physical: I have interviewed and examined the patient. I have reviewed the patient's H&P dated:  There are no significant changes.          Ready For Surgery From Anesthesia Perspective.

## 2019-09-12 NOTE — ANESTHESIA POSTPROCEDURE EVALUATION
Anesthesia Post Evaluation    Patient: Marshall Rivero    Procedure(s) Performed: Procedure(s) (LRB):  ESOPHAGOGASTRODUODENOSCOPY (EGD) (N/A)  COLONOSCOPY (N/A)    Final Anesthesia Type: MAC  Patient location during evaluation: GI PACU  Patient participation: No - Unable to Participate, Sedation  Level of consciousness: awake and alert  Post-procedure vital signs: reviewed and stable  Pain management: adequate  Airway patency: patent  PONV status at discharge: No PONV  Anesthetic complications: no      Cardiovascular status: blood pressure returned to baseline  Respiratory status: unassisted  Hydration status: euvolemic  Follow-up not needed.          Vitals Value Taken Time   /94 9/12/2019  3:29 PM   Temp 36.7 °C (98.1 °F) 9/12/2019  1:12 PM   Pulse 62 9/12/2019  3:29 PM   Resp 18 9/12/2019  3:29 PM   SpO2 95 % 9/12/2019  3:29 PM         No case tracking events are documented in the log.      Pain/Tonya Score: Tonya Score: 10 (9/12/2019  3:29 PM)

## 2019-09-12 NOTE — [employer] PATIENT INSTRUCTIONS
Discharge Summary/Instructions after an Endoscopic Procedure  Patient Name: Marshall Rivero  Patient MRN: [id number]  Patient YOB: 1955 Thursday, September 12, 2019 Giovanni Resendez III, MD  RESTRICTIONS:  During your procedure today, you received medications for sedation.  These   medications may affect your judgment, balance and coordination.  Therefore,   for 24 hours, you have the following restrictions:   - DO NOT drive a car, operate machinery, make legal/financial decisions,   sign important papers or drink alcohol.    ACTIVITY:  Today: no heavy lifting, straining or running due to procedural   sedation/anesthesia.  The following day: return to full activity including work.  DIET:  Eat and drink normally unless instructed otherwise.     TREATMENT FOR COMMON SIDE EFFECTS:  - Mild abdominal pain, nausea, belching, bloating or excessive gas:  rest,   eat lightly and use a heating pad.  - Sore Throat: treat with throat lozenges and/or gargle with warm salt   water.  - Because air was used during the procedure, expelling large amounts of air   from your rectum or belching is normal.  - If a bowel prep was taken, you may not have a bowel movement for 1-3 days.    This is normal.  SYMPTOMS TO WATCH FOR AND REPORT TO YOUR PHYSICIAN:  1. Abdominal pain or bloating, other than gas cramps.  2. Chest pain.  3. Back pain.  4. Signs of infection such as: chills or fever occurring within 24 hours   after the procedure.  5. Rectal bleeding, which would show as bright red, maroon, or black stools.   (A tablespoon of blood from the rectum is not serious, especially if   hemorrhoids are present.)  6. Vomiting.  7. Weakness or dizziness.  GO DIRECTLY TO THE NEAREST EMERGENCY ROOM IF YOU HAVE ANY OF THE FOLLOWING:      Difficulty breathing              Chills and/or fever over 101 F   Persistent vomiting and/or vomiting blood   Severe abdominal pain   Severe chest pain   Black, tarry stools   Bleeding- more than one  tablespoon   Any other symptom or condition that you feel may need urgent attention  Your doctor recommends these additional instructions:  If any biopsies were taken, your doctors clinic will contact you in 1 to 2   weeks with any results.  - Discharge patient to home (via wheelchair).   - High fiber diet.   - Continue present medications.   - Await pathology results.   - Return to referring physician as previously scheduled.   - Discharge patient to home (via wheelchair).   - High fiber diet.   - Continue present medications.   - Await pathology results.   - Repeat colonoscopy in 10 years for surveillance based on pathology   results.   - Return to referring physician as previously scheduled.   - Repeat colonoscopy in 10 years for surveillance based on pathology   results.  For questions, problems or results please call your physician Giovanni Resendez III, MD at Work:  [phone]  If you have any questions about the above instructions, call the GI   department at [phone] or call the endoscopy unit at [phone]   from 7am until 3 pm.  [facility] [place], EMERGENCY ROOM PHONE NUMBER:   [phone]  IF A COMPLICATION OR EMERGENCY SITUATION ARISES AND YOU ARE UNABLE TO REACH   YOUR PHYSICIAN - GO DIRECTLY TO THE EMERGENCY ROOM.  I have read or have had read to me these discharge instructions for my   procedure and have received a written copy.  I understand these   instructions and will follow-up with my physician if I have any questions.     __________________________________       _____________________________________  Nurse Signature                                          Patient/Designated   Responsible Party Signature  Giovanni Resendez III, MD  9/12/2019 3:34:45 PM  This report has been verified and signed electronically.  [employer]

## 2019-09-12 NOTE — H&P
Short Stay Endoscopy History and Physical    PCP - Diogo Norman MD    Procedure - EGD and Colonoscopy  ASA - 2  Mallampati - per anesthesia  History of Anesthesia problems - no  Family history Anesthesia problems -  no     HPI:  This is a 63 y.o.male here for evaluation of :Iron Deficiency Anemia     Reflux - no  Dysphagia - no  Abdominal pain - no  Diarrhea - no  Anemia - yes  GI bleeding - no  Nausea and vomiting-no  Early satiety-no  aversion to sight or smell of food-no    ROS:  Constitutional: No fevers, chills, No weight loss  ENT: No allergies  CV: No chest pain  Pulm: No cough, No shortness of breath  Ophtho: No vision changes  GI: see HPI  Derm: No rash  Heme: No lymphadenopathy, No bruising  MSK: No arthritis  : No dysuria, No hematuria  Endo: No hot or cold intolerance  Neuro: No syncope, No seizure  Psych: No anxiety, No depression    Medical History:  Past Medical History:   Diagnosis Date    Depression     Hyperlipidemia     Hypertension     Iron deficiency anemia due to chronic blood loss 5/8/2019    Obesity     Paranoia     Schizoaffective disorder        Surgical History:History reviewed. No pertinent surgical history.    Family History:  Family History   Problem Relation Age of Onset    Diabetes Mother     Hypertension Mother     Heart disease Mother     Hyperlipidemia Mother     Arthritis Mother     Diabetes Father     Hypertension Father     Hyperlipidemia Father     Heart disease Father     Arthritis Father     Stroke Neg Hx     Kidney disease Neg Hx     Cancer Neg Hx        Social History:  Social History     Socioeconomic History    Marital status:      Spouse name: Not on file    Number of children: Not on file    Years of education: Not on file    Highest education level: Not on file   Occupational History    Not on file   Social Needs    Financial resource strain: Not on file    Food insecurity:     Worry: Not on file     Inability: Not on  file    Transportation needs:     Medical: Not on file     Non-medical: Not on file   Tobacco Use    Smoking status: Never Smoker    Smokeless tobacco: Never Used   Substance and Sexual Activity    Alcohol use: Yes     Alcohol/week: 4.8 oz     Types: 2 Cans of beer, 6 Shots of liquor per week    Drug use: No    Sexual activity: Yes   Lifestyle    Physical activity:     Days per week: Not on file     Minutes per session: Not on file    Stress: Not on file   Relationships    Social connections:     Talks on phone: Not on file     Gets together: Not on file     Attends Confucianist service: Not on file     Active member of club or organization: Not on file     Attends meetings of clubs or organizations: Not on file     Relationship status: Not on file   Other Topics Concern    Not on file   Social History Narrative    Not on file       Allergies: Review of patient's allergies indicates:  No Known Allergies    Medications:   No current facility-administered medications on file prior to encounter.      Current Outpatient Medications on File Prior to Encounter   Medication Sig Dispense Refill    alcohol swabs (BD ALCOHOL SWABS) PadM Apply 1 each topically as needed. 200 each 12    amLODIPine (NORVASC) 10 MG tablet Take 1 tablet (10 mg total) by mouth once daily. 90 tablet 3    blood sugar diagnostic (BLOOD GLUCOSE TEST) Strp 1 each by Misc.(Non-Drug; Combo Route) route 2 (two) times daily as needed. 200 each 12    blood-glucose meter kit Use as instructed 1 each 0    labetalol (NORMODYNE) 100 MG tablet Take 1 tablet (100 mg total) by mouth 2 (two) times daily. 180 tablet 3    lancets Misc 1 each by Misc.(Non-Drug; Combo Route) route 2 (two) times daily as needed. 200 each 12    metFORMIN (GLUCOPHAGE) 500 MG tablet Take 1 tablet (500 mg total) by mouth 2 (two) times daily with meals. 180 tablet 3    QUEtiapine (SEROQUEL) 100 MG Tab One in am and one at noon----------- 60 tablet 6    QUEtiapine (SEROQUEL)  300 MG Tab Take 1 tablet (300 mg total) by mouth nightly. 30 tablet 6       Objective Findings:    Vital Signs:  Vitals:    09/12/19 1312   BP: (!) 153/95   Pulse: 70   Resp: 18   Temp: 98.1 °F (36.7 °C)           Physical Exam:  General Appearance: Well appearing in no acute distress  Eyes:    No scleral icterus  ENT: Neck supple, Lips, mucosa, and tongue normal; teeth and gums normal  Lungs: CTA bilaterally in anterior and posterior fields, no wheezes, no crackles.  Heart:  Regular rate, S1, S2 normal, no murmurs heard.  Abdomen: Soft, non tender, non distended with normal bowel sounds. No hepatosplenomegaly, ascites, or mass.  Extremities: No clubbing, cyanosis or edema  Skin: No rash    Labs:  Reviewed    Plan:EGD and Colonoscopy  I have explained the risks and benefits of endoscopy procedures to the patient including but not limited to bleeding, perforation, infection, and death. The patient wishes to proceed.

## 2019-09-12 NOTE — [employer] PATIENT INSTRUCTIONS
Discharge Summary/Instructions after an Endoscopic Procedure  Patient Name: Marshall Rivero  Patient MRN: [id number]  Patient YOB: 1955 Thursday, September 12, 2019 Giovanni Resendez III, MD  RESTRICTIONS:  During your procedure today, you received medications for sedation.  These   medications may affect your judgment, balance and coordination.  Therefore,   for 24 hours, you have the following restrictions:   - DO NOT drive a car, operate machinery, make legal/financial decisions,   sign important papers or drink alcohol.    ACTIVITY:  Today: no heavy lifting, straining or running due to procedural   sedation/anesthesia.  The following day: return to full activity including work.  DIET:  Eat and drink normally unless instructed otherwise.     TREATMENT FOR COMMON SIDE EFFECTS:  - Mild abdominal pain, nausea, belching, bloating or excessive gas:  rest,   eat lightly and use a heating pad.  - Sore Throat: treat with throat lozenges and/or gargle with warm salt   water.  - Because air was used during the procedure, expelling large amounts of air   from your rectum or belching is normal.  - If a bowel prep was taken, you may not have a bowel movement for 1-3 days.    This is normal.  SYMPTOMS TO WATCH FOR AND REPORT TO YOUR PHYSICIAN:  1. Abdominal pain or bloating, other than gas cramps.  2. Chest pain.  3. Back pain.  4. Signs of infection such as: chills or fever occurring within 24 hours   after the procedure.  5. Rectal bleeding, which would show as bright red, maroon, or black stools.   (A tablespoon of blood from the rectum is not serious, especially if   hemorrhoids are present.)  6. Vomiting.  7. Weakness or dizziness.  GO DIRECTLY TO THE NEAREST EMERGENCY ROOM IF YOU HAVE ANY OF THE FOLLOWING:      Difficulty breathing              Chills and/or fever over 101 F   Persistent vomiting and/or vomiting blood   Severe abdominal pain   Severe chest pain   Black, tarry stools   Bleeding- more than one  tablespoon   Any other symptom or condition that you feel may need urgent attention  Your doctor recommends these additional instructions:  If any biopsies were taken, your doctors clinic will contact you in 1 to 2   weeks with any results.  - Discharge patient to home (via wheelchair).   - Resume previous diet.   - Continue present medications.   - Await pathology results.   - Return to referring physician as previously scheduled.   - Could consider VCE if felt needed. GH  For questions, problems or results please call your physician Giovanni Resendez III, MD at Work:  [phone]  If you have any questions about the above instructions, call the GI   department at [phone] or call the endoscopy unit at [phone]   from 7am until 3 pm.  [facility] [place], EMERGENCY ROOM PHONE NUMBER:   [phone]  IF A COMPLICATION OR EMERGENCY SITUATION ARISES AND YOU ARE UNABLE TO REACH   YOUR PHYSICIAN - GO DIRECTLY TO THE EMERGENCY ROOM.  I have read or have had read to me these discharge instructions for my   procedure and have received a written copy.  I understand these   instructions and will follow-up with my physician if I have any questions.     __________________________________       _____________________________________  Nurse Signature                                          Patient/Designated   Responsible Party Signature  Giovanni Resendez III, MD  9/12/2019 3:40:10 PM  This report has been verified and signed electronically.  [employer]

## 2019-09-12 NOTE — TRANSFER OF CARE
"Anesthesia Transfer of Care Note    Patient: Marshall Rivero    Procedure(s) Performed: Procedure(s) (LRB):  ESOPHAGOGASTRODUODENOSCOPY (EGD) (N/A)  COLONOSCOPY (N/A)    Patient location: GI    Anesthesia Type: MAC    Transport from OR: Transported from OR on room air with adequate spontaneous ventilation    Post pain: adequate analgesia    Post assessment: no apparent anesthetic complications    Post vital signs: stable    Level of consciousness: responds to stimulation    Nausea/Vomiting: no nausea/vomiting    Complications: none    Transfer of care protocol was followed      Last vitals:   Visit Vitals  BP (!) 153/95 (BP Location: Left arm, Patient Position: Sitting)   Pulse 70   Temp 36.7 °C (98.1 °F) (Temporal)   Resp 18   Ht 5' 8" (1.727 m)   Wt 98.5 kg (217 lb 2.5 oz)   SpO2 97%   BMI 33.02 kg/m²     "

## 2019-09-12 NOTE — ANESTHESIA RELEASE NOTE
"Anesthesia Release from PACU Note    Patient: Marshall Rivero    Procedure(s) Performed: Procedure(s) (LRB):  ESOPHAGOGASTRODUODENOSCOPY (EGD) (N/A)  COLONOSCOPY (N/A)    Anesthesia type: general    Post pain: Adequate analgesia    Post assessment: no apparent anesthetic complications    Last Vitals:   Visit Vitals  BP (!) 140/85 (BP Location: Left arm, Patient Position: Lying)   Pulse 69   Temp 36.7 °C (98.1 °F) (Temporal)   Resp 18   Ht 5' 8" (1.727 m)   Wt 98.5 kg (217 lb 2.5 oz)   SpO2 (!) 94%   BMI 33.02 kg/m²       Post vital signs: stable    Level of consciousness: awake    Nausea/Vomiting: no nausea/no vomiting    Complications: none    Airway Patency: patent    Respiratory: unassisted    Cardiovascular: stable and blood pressure at baseline    Hydration: euvolemic  "

## 2019-09-12 NOTE — DISCHARGE SUMMARY
[facility]  Brief Operative Note     SUMMARY     Surgery Date: 9/12/2019     Surgeon(s) and Role:     * Giovanni Resendez III, MD - Primary    Assisting Surgeon: None    Pre-op Diagnosis:  JOSE MIGUEL (iron deficiency anemia) [D50.9]    Post-op Diagnosis:  Post-Op Diagnosis Codes:     * JOSE MIGUEL (iron deficiency anemia) [D50.9]      - Colon Polyp  Procedure(s) (LRB):  ESOPHAGOGASTRODUODENOSCOPY (EGD) (N/A)  COLONOSCOPY (N/A)    Anesthesia: Monitor Anesthesia Care    Description of the findings of the procedure: Procedures completed. See Procedure note for full details.    Findings/Key Components: Procedures completed. See Procedure note for full details.    Prosthetics/Devices: None    Estimated Blood Loss: * No values recorded between 9/12/2019 12:00 AM and 9/12/2019  3:43 PM *         Specimens:   Specimen (12h ago, onward)    Start     Ordered    09/12/19 1442  Specimen to Pathology - Surgery  Once     Comments:  1. Small bowel biopsies- r/o celiac biopsies2. Sigmoid colon polyp     Start Status     09/12/19 1442 Collected (09/12/19 1513) Order ID: [id number]       09/12/19 1513          Discharge Note    SUMMARY     Admit Date: 9/12/2019    Discharge Date and Time: 9/12/2019    Hospital Course (synopsis of major diagnoses, care, treatment, and services provided during the course of the hospital stay):  Procedures completed. See Procedure note for full details. Discharge patient when discharge criteria met.    Final Diagnosis: Post-Op Diagnosis Codes:     * JOSE MIGUEL (iron deficiency anemia) [D50.9]      - Colon Polyp  Disposition: Discharge patient when discharge criteria met.    Follow Up/Patient Instructions:       Medications:  Reconciled Home Medications:   Current Discharge Medication List      CONTINUE these medications which have NOT CHANGED    Details   alcohol swabs (BD ALCOHOL SWABS) PadM Apply 1 each topically as needed.  Qty: 200 each, Refills: 12      amLODIPine (NORVASC) 10 MG tablet Take 1 tablet (10  mg total) by mouth once daily.  Qty: 90 tablet, Refills: 3      blood sugar diagnostic (BLOOD GLUCOSE TEST) Strp 1 each by Misc.(Non-Drug; Combo Route) route 2 (two) times daily as needed.  Qty: 200 each, Refills: 12      blood-glucose meter kit Use as instructed  Qty: 1 each, Refills: 0      ferrous sulfate (FEOSOL) 325 mg (65 mg iron) Tab tablet TAKE 1 TABLET BY MOUTH DAILY  Qty: 30 tablet, Refills: 0    Associated Diagnoses: Iron deficiency anemia due to chronic blood loss      labetalol (NORMODYNE) 100 MG tablet Take 1 tablet (100 mg total) by mouth 2 (two) times daily.  Qty: 180 tablet, Refills: 3      lancets Misc 1 each by Misc.(Non-Drug; Combo Route) route 2 (two) times daily as needed.  Qty: 200 each, Refills: 12      metFORMIN (GLUCOPHAGE) 500 MG tablet Take 1 tablet (500 mg total) by mouth 2 (two) times daily with meals.  Qty: 180 tablet, Refills: 3      !! QUEtiapine (SEROQUEL) 100 MG Tab One in am and one at noon-----------  Qty: 60 tablet, Refills: 6      !! QUEtiapine (SEROQUEL) 300 MG Tab Take 1 tablet (300 mg total) by mouth nightly.  Qty: 30 tablet, Refills: 6       !! - Potential duplicate medications found. Please discuss with provider.         Discharge Procedure Orders   Diet general     Activity as tolerated

## 2019-10-03 RX ORDER — QUETIAPINE FUMARATE 100 MG/1
TABLET, FILM COATED ORAL
Qty: 60 TABLET | Refills: 6 | Status: SHIPPED | OUTPATIENT
Start: 2019-10-03

## 2019-10-03 RX ORDER — QUETIAPINE FUMARATE 300 MG/1
TABLET, FILM COATED ORAL
Qty: 30 TABLET | Refills: 6 | Status: SHIPPED | OUTPATIENT
Start: 2019-10-03

## 2019-10-19 ENCOUNTER — PATIENT MESSAGE (OUTPATIENT)
Dept: GASTROENTEROLOGY | Facility: CLINIC | Age: 64
End: 2019-10-19

## 2019-11-01 DIAGNOSIS — E11.9 TYPE 2 DIABETES MELLITUS WITHOUT COMPLICATION: ICD-10-CM

## 2020-02-10 ENCOUNTER — PES CALL (OUTPATIENT)
Dept: ADMINISTRATIVE | Facility: CLINIC | Age: 65
End: 2020-02-10

## 2020-03-31 RX ORDER — AMLODIPINE BESYLATE 10 MG/1
TABLET ORAL
Qty: 90 TABLET | Refills: 0 | Status: SHIPPED | OUTPATIENT
Start: 2020-03-31 | End: 2020-06-29

## 2020-03-31 RX ORDER — LABETALOL 100 MG/1
TABLET, FILM COATED ORAL
Qty: 180 TABLET | Refills: 0 | Status: SHIPPED | OUTPATIENT
Start: 2020-03-31 | End: 2020-06-29

## 2020-05-21 ENCOUNTER — OFFICE VISIT (OUTPATIENT)
Dept: OPHTHALMOLOGY | Facility: CLINIC | Age: 65
End: 2020-05-21
Payer: MEDICARE

## 2020-05-21 ENCOUNTER — OFFICE VISIT (OUTPATIENT)
Dept: INTERNAL MEDICINE | Facility: CLINIC | Age: 65
End: 2020-05-21
Payer: MEDICARE

## 2020-05-21 VITALS
SYSTOLIC BLOOD PRESSURE: 126 MMHG | OXYGEN SATURATION: 98 % | HEIGHT: 68 IN | BODY MASS INDEX: 34.08 KG/M2 | DIASTOLIC BLOOD PRESSURE: 74 MMHG | TEMPERATURE: 97 F | HEART RATE: 71 BPM | WEIGHT: 224.88 LBS

## 2020-05-21 DIAGNOSIS — Z00.00 ENCOUNTER FOR PREVENTIVE HEALTH EXAMINATION: Primary | ICD-10-CM

## 2020-05-21 DIAGNOSIS — E11.9 TYPE 2 DIABETES MELLITUS WITHOUT RETINOPATHY: Primary | ICD-10-CM

## 2020-05-21 DIAGNOSIS — E11.65 TYPE 2 DIABETES MELLITUS WITH HYPERGLYCEMIA, WITHOUT LONG-TERM CURRENT USE OF INSULIN: ICD-10-CM

## 2020-05-21 DIAGNOSIS — H25.13 NUCLEAR SCLEROSIS, BILATERAL: ICD-10-CM

## 2020-05-21 DIAGNOSIS — H25.013 CORTICAL AGE-RELATED CATARACT OF BOTH EYES: ICD-10-CM

## 2020-05-21 DIAGNOSIS — D50.0 IRON DEFICIENCY ANEMIA DUE TO CHRONIC BLOOD LOSS: ICD-10-CM

## 2020-05-21 DIAGNOSIS — E78.5 HYPERLIPIDEMIA, UNSPECIFIED HYPERLIPIDEMIA TYPE: ICD-10-CM

## 2020-05-21 DIAGNOSIS — F20.0 PARANOID SCHIZOPHRENIA: ICD-10-CM

## 2020-05-21 DIAGNOSIS — I10 ESSENTIAL HYPERTENSION: ICD-10-CM

## 2020-05-21 PROCEDURE — 3078F DIAST BP <80 MM HG: CPT | Mod: HCNC,CPTII,S$GLB, | Performed by: NURSE PRACTITIONER

## 2020-05-21 PROCEDURE — 99499 UNLISTED E&M SERVICE: CPT | Mod: S$GLB,,, | Performed by: NURSE PRACTITIONER

## 2020-05-21 PROCEDURE — 99499 RISK ADDL DX/OHS AUDIT: ICD-10-PCS | Mod: S$GLB,,, | Performed by: NURSE PRACTITIONER

## 2020-05-21 PROCEDURE — G0439 PPPS, SUBSEQ VISIT: HCPCS | Mod: HCNC,S$GLB,, | Performed by: NURSE PRACTITIONER

## 2020-05-21 PROCEDURE — G0439 PR MEDICARE ANNUAL WELLNESS SUBSEQUENT VISIT: ICD-10-PCS | Mod: HCNC,S$GLB,, | Performed by: NURSE PRACTITIONER

## 2020-05-21 PROCEDURE — 3074F PR MOST RECENT SYSTOLIC BLOOD PRESSURE < 130 MM HG: ICD-10-PCS | Mod: HCNC,CPTII,S$GLB, | Performed by: NURSE PRACTITIONER

## 2020-05-21 PROCEDURE — 99999 PR PBB SHADOW E&M-EST. PATIENT-LVL IV: CPT | Mod: PBBFAC,HCNC,, | Performed by: NURSE PRACTITIONER

## 2020-05-21 PROCEDURE — 99499 UNLISTED E&M SERVICE: CPT | Mod: S$GLB,,, | Performed by: OPTOMETRIST

## 2020-05-21 PROCEDURE — 99999 PR PBB SHADOW E&M-EST. PATIENT-LVL I: CPT | Mod: PBBFAC,HCNC,, | Performed by: OPTOMETRIST

## 2020-05-21 PROCEDURE — 99999 PR PBB SHADOW E&M-EST. PATIENT-LVL IV: ICD-10-PCS | Mod: PBBFAC,HCNC,, | Performed by: NURSE PRACTITIONER

## 2020-05-21 PROCEDURE — 3074F SYST BP LT 130 MM HG: CPT | Mod: HCNC,CPTII,S$GLB, | Performed by: NURSE PRACTITIONER

## 2020-05-21 PROCEDURE — 3078F PR MOST RECENT DIASTOLIC BLOOD PRESSURE < 80 MM HG: ICD-10-PCS | Mod: HCNC,CPTII,S$GLB, | Performed by: NURSE PRACTITIONER

## 2020-05-21 PROCEDURE — 92014 PR EYE EXAM, EST PATIENT,COMPREHESV: ICD-10-PCS | Mod: HCNC,S$GLB,, | Performed by: OPTOMETRIST

## 2020-05-21 PROCEDURE — 99999 PR PBB SHADOW E&M-EST. PATIENT-LVL I: ICD-10-PCS | Mod: PBBFAC,HCNC,, | Performed by: OPTOMETRIST

## 2020-05-21 PROCEDURE — 92014 COMPRE OPH EXAM EST PT 1/>: CPT | Mod: HCNC,S$GLB,, | Performed by: OPTOMETRIST

## 2020-05-21 PROCEDURE — 99499 RISK ADDL DX/OHS AUDIT: ICD-10-PCS | Mod: S$GLB,,, | Performed by: OPTOMETRIST

## 2020-05-21 NOTE — PROGRESS NOTES
HPI     Pts last exam was 2 years ago. PT c/o blurred overall va and wears otc   readers  Diabetic eye exam  Diagnosed with diabetes in beginning of 2019  Recent vision fluctuations no  Blood sugar: 7.3  HPI    Any vision changes since last exam: decreased overall va  Eye pain: no  Other ocular symptoms: no     Do you wear currently wear glasses or contacts? otc readers     Interested in contacts today? If needed       Last edited by Rhonda Carey MA on 5/21/2020 10:02 AM. (History)            Assessment /Plan     For exam results, see Encounter Report.    Type 2 diabetes mellitus without retinopathy  No diabetic retinopathy in either eye  Continue close care with PCP   Monitor 12 months    Nuclear sclerosis, bilateral  Cortical age-related cataract of both eyes  Surgery is not indicated at this time.   Monitor 12 months.    Spec rx declined  Ok to c/w OTC readers    RTC 1 yr for dilated eye exam or PRN if any problems.   Discussed above and answered questions.

## 2020-05-21 NOTE — PATIENT INSTRUCTIONS
Counseling and Referral of Other Preventative  (Italic type indicates deductible and co-insurance are waived)    Patient Name: Marshall Rivero  Today's Date: 5/21/2020    Health Maintenance       Date Due Completion Date    Foot Exam 11/04/1966 ---    HIV Screening 11/04/1971 ---    Pneumococcal Vaccine (Medium Risk) (1 of 1 - PPSV23) 11/04/1975 ---    Low Dose Statin 11/04/1977 ---    Shingles Vaccine (1 of 2) 11/04/2006 ---    Urine Microalbumin 09/18/2018 9/18/2017    Hemoglobin A1c 10/18/2019 4/18/2019    Lipid Panel 04/18/2020 4/18/2019    Eye Exam 05/16/2020 5/16/2019    Override on 5/16/2019: Done    Influenza Vaccine (Season Ended) 09/01/2020 ---    Colonoscopy 09/12/2029 9/12/2019        No orders of the defined types were placed in this encounter.    The following information is provided to all patients.  This information is to help you find resources for any of the problems found today that may be affecting your health:                Living healthy guide: www.[facility].[place].gov      Understanding Diabetes: www.diabetes.org      Eating healthy: www.cdc.gov/healthyweight      CDC home safety checklist: www.cdc.gov/steadi/patient.html      Agency on Aging: www.goea.[place].gov      Alcoholics anonymous (AA): www.aa.org      Physical Activity: www.mary.nih.gov/qr9exnt      Tobacco use: www.quitwithusla.org

## 2020-05-21 NOTE — PROGRESS NOTES
"Marshall Rivero presented for a  Medicare AWV and comprehensive Health Risk Assessment today. The following components were reviewed and updated:    · Medical history  · Family History  · Social history  · Allergies and Current Medications  · Health Risk Assessment  · Health Maintenance  · Care Team     ** See Completed Assessments for Annual Wellness Visit within the encounter summary.**       The following assessments were completed:  · Living Situation  · CAGE  · Depression Screening  · Timed Get Up and Go  · Whisper Test  · Cognitive Function Screening  · Nutrition Screening  · ADL Screening  · PAQ Screening    Vitals:    05/21/20 0905   BP: 126/74   BP Location: Left arm   Patient Position: Sitting   BP Method: Medium (Manual)   Pulse: 71   Temp: 97.2 °F (36.2 °C)   TempSrc: Tympanic   SpO2: 98%   Weight: 102 kg (224 lb 13.9 oz)   Height: 5' 8" (1.727 m)     Body mass index is 34.19 kg/m².  Physical Exam   Constitutional: He is oriented to person, place, and time.   HENT:   Head: Normocephalic and atraumatic.   Right Ear: Tympanic membrane normal.   Left Ear: Tympanic membrane normal.   Eyes: Conjunctivae and EOM are normal.   Neck: Normal range of motion. Neck supple.   Cardiovascular: Normal rate, regular rhythm, normal heart sounds and intact distal pulses.   Pulmonary/Chest: Effort normal and breath sounds normal.   Abdominal: Soft. Bowel sounds are normal.   Musculoskeletal: Normal range of motion.   Neurological: He is alert and oriented to person, place, and time.   Skin: Skin is warm and dry.   Psychiatric: His mood appears anxious. His speech is rapid and/or pressured. Thought content is paranoid.         Diagnoses and health risks identified today and associated recommendations/orders:    1. Encounter for preventive health examination  Completed today    2. Essential hypertension   Stable today. Does not check BP at home. Denies HTNsive symptoms. States he is compliant w/ BP meds    3. Hyperlipidemia, " unspecified hyperlipidemia type  Pt declines labs .last labs. States he exercises regularly.    04/18/19 0906 HDL 36 Low Final result   04/18/19 0906 CHOL 177 -- Final result   04/18/19 0906 TRIG 95 -- Final result   04/18/19 0906 LDLCALC 122.0 -- Final result   04/18/19 0906 CHOLHDL 20.3 -- Final result   04/18/19 0906 NONHDLCHOL 141 -- Final result   04/18/19 0906 TOTALCHOLEST 4.9 -- Final result       4. Iron deficiency anemia due to chronic blood loss  Pt declines follow up with hematology. Does not take iron states its poison.    5. Type 2 diabetes mellitus with hyperglycemia, without long-term current use of insulin  Pt does not check BG. Does not follow DM diet. Does not take metformin - states it has acid in it and its not safe for him to take.    04/18/19 0906 HGBA1C 7.3 High Final result     6. Paranoid schizophrenia  Pt overdue for visit. Does not want to follow up. States we are trying to set him up because none of those treatments worked in the past. Pt became unhappy when I asked him about this follow up.      Provided Marshall with a 5-10 year written screening schedule and personal prevention plan. Recommendations were developed using the USPSTF age appropriate recommendations. Education, counseling, and referrals were provided as needed. After Visit Summary printed and given to patient which includes a list of additional screenings\tests needed.    Pt declines immunizations and all follow up    Annmarie Easley NP

## 2020-07-02 ENCOUNTER — PATIENT OUTREACH (OUTPATIENT)
Dept: ADMINISTRATIVE | Facility: HOSPITAL | Age: 65
End: 2020-07-02

## 2020-07-02 NOTE — PROGRESS NOTES
Working [employer] report for pt. over due on HM A1c & urine micro albumin. Pt declined to schedule.

## 2020-07-27 ENCOUNTER — PATIENT MESSAGE (OUTPATIENT)
Dept: ADMINISTRATIVE | Facility: HOSPITAL | Age: 65
End: 2020-07-27

## 2020-07-27 ENCOUNTER — PATIENT OUTREACH (OUTPATIENT)
Dept: ADMINISTRATIVE | Facility: HOSPITAL | Age: 65
End: 2020-07-27

## 2020-07-27 NOTE — PROGRESS NOTES
Called Pt to schedule Physical and Labs   VM Full       Fariha ORDONEZ LPN Care Coordinator  Care Coordination Department  [facility]  [phone]

## 2020-09-03 ENCOUNTER — PATIENT OUTREACH (OUTPATIENT)
Dept: ADMINISTRATIVE | Facility: HOSPITAL | Age: 65
End: 2020-09-03

## 2020-09-03 NOTE — PROGRESS NOTES
Tried to outreach to patient Unable to leave          Fariha ORDONEZ LPN Care Coordinator  Care Coordination Department  [facility]  [phone]

## 2020-09-27 RX ORDER — LABETALOL 100 MG/1
TABLET, FILM COATED ORAL
Qty: 180 TABLET | Refills: 0 | Status: SHIPPED | OUTPATIENT
Start: 2020-09-27 | End: 2020-12-26

## 2020-09-27 RX ORDER — AMLODIPINE BESYLATE 10 MG/1
TABLET ORAL
Qty: 90 TABLET | Refills: 0 | Status: SHIPPED | OUTPATIENT
Start: 2020-09-27 | End: 2020-12-26

## 2020-09-28 RX ORDER — METFORMIN HYDROCHLORIDE 500 MG/1
500 TABLET ORAL 2 TIMES DAILY WITH MEALS
Qty: 180 TABLET | Refills: 0 | Status: SHIPPED | OUTPATIENT
Start: 2020-09-28 | End: 2020-12-26

## 2020-09-30 ENCOUNTER — TELEPHONE (OUTPATIENT)
Dept: FAMILY MEDICINE | Facility: CLINIC | Age: 65
End: 2020-09-30

## 2020-09-30 DIAGNOSIS — F20.9 SCHIZOPHRENIA, UNSPECIFIED TYPE: Primary | ICD-10-CM

## 2020-09-30 NOTE — TELEPHONE ENCOUNTER
----- Message from Blanca Johnson sent at 9/30/2020 11:38 AM CDT -----  Regarding: referral  .Type:  Patient Requesting Referral    Who Called:   Does the patient already have the specialty appointment scheduled?: pt has seen the doctor   If yes, what is the date of that appointment?:#n/a  Referral to What Specialty: psychology   Reason for Referral:   Does the patient want the referral with a specific physician?:   Is the specialist an [facility]?:   Patient Requesting a Response?: yes  Would the patient rather a call back or a response via [facility]? #call back   Best Call Back Number: [phone]     Additional Information: pr needs referral for insurance coverage

## 2020-10-26 LAB
HBA1C MFR BLD: 7.1 %
MICROALBUMIN URINE: 11

## 2020-10-30 ENCOUNTER — PATIENT OUTREACH (OUTPATIENT)
Dept: ADMINISTRATIVE | Facility: HOSPITAL | Age: 65
End: 2020-10-30

## 2020-10-30 NOTE — PROGRESS NOTES
HA1c REPORT: Attempting to contact pt to schedule overdue HM & F/U. Unable to reach patient at this time. Left voicemail.

## 2020-11-02 NOTE — PROGRESS NOTES
HA1c REPORT Pt stated he does not want any labs done or any metformin. Pt stated health care is murders. He has friends who die from putting them on all this toxic medication. Pt stated he doesn't need a Dr either. He does not like where the health care system has gone. Pt stated he doesn't need Dr Norman as his pcp, to remove him. Care Team updated.

## 2020-12-08 ENCOUNTER — PATIENT OUTREACH (OUTPATIENT)
Dept: ADMINISTRATIVE | Facility: HOSPITAL | Age: 65
End: 2020-12-08

## 2020-12-09 ENCOUNTER — PATIENT OUTREACH (OUTPATIENT)
Dept: ADMINISTRATIVE | Facility: HOSPITAL | Age: 65
End: 2020-12-09

## 2021-01-22 ENCOUNTER — OFFICE VISIT (OUTPATIENT)
Dept: OPHTHALMOLOGY | Facility: CLINIC | Age: 66
End: 2021-01-22
Payer: MEDICARE

## 2021-01-22 DIAGNOSIS — E11.36 DIABETIC CATARACT OF BOTH EYES: ICD-10-CM

## 2021-01-22 DIAGNOSIS — H25.013 CORTICAL AGE-RELATED CATARACT OF BOTH EYES: ICD-10-CM

## 2021-01-22 DIAGNOSIS — E11.9 TYPE 2 DIABETES MELLITUS WITHOUT RETINOPATHY: Primary | ICD-10-CM

## 2021-01-22 DIAGNOSIS — H25.13 NUCLEAR SCLEROSIS, BILATERAL: ICD-10-CM

## 2021-01-22 PROCEDURE — 2023F DILAT RTA XM W/O RTNOPTHY: CPT | Mod: S$GLB,,, | Performed by: OPTOMETRIST

## 2021-01-22 PROCEDURE — 99499 RISK ADDL DX/OHS AUDIT: ICD-10-PCS | Mod: S$GLB,,, | Performed by: OPTOMETRIST

## 2021-01-22 PROCEDURE — 92014 PR EYE EXAM, EST PATIENT,COMPREHESV: ICD-10-PCS | Mod: S$GLB,,, | Performed by: OPTOMETRIST

## 2021-01-22 PROCEDURE — 99999 PR PBB SHADOW E&M-EST. PATIENT-LVL II: CPT | Mod: PBBFAC,,, | Performed by: OPTOMETRIST

## 2021-01-22 PROCEDURE — 99499 UNLISTED E&M SERVICE: CPT | Mod: S$GLB,,, | Performed by: OPTOMETRIST

## 2021-01-22 PROCEDURE — 99999 PR PBB SHADOW E&M-EST. PATIENT-LVL II: ICD-10-PCS | Mod: PBBFAC,,, | Performed by: OPTOMETRIST

## 2021-01-22 PROCEDURE — 2023F PR DILATED RETINAL EXAM W/O EVID OF RETINOPATHY: ICD-10-PCS | Mod: S$GLB,,, | Performed by: OPTOMETRIST

## 2021-01-22 PROCEDURE — 92014 COMPRE OPH EXAM EST PT 1/>: CPT | Mod: S$GLB,,, | Performed by: OPTOMETRIST

## 2021-02-04 ENCOUNTER — OFFICE VISIT (OUTPATIENT)
Dept: OPHTHALMOLOGY | Facility: CLINIC | Age: 66
End: 2021-02-04
Payer: MEDICARE

## 2021-02-04 DIAGNOSIS — H52.4 ASTIGMATISM WITH PRESBYOPIA, BILATERAL: Primary | ICD-10-CM

## 2021-02-04 DIAGNOSIS — H52.203 ASTIGMATISM WITH PRESBYOPIA, BILATERAL: Primary | ICD-10-CM

## 2021-02-04 PROCEDURE — 92015 DETERMINE REFRACTIVE STATE: CPT | Mod: S$GLB,,, | Performed by: OPTOMETRIST

## 2021-02-04 PROCEDURE — 92015 PR REFRACTION: ICD-10-PCS | Mod: S$GLB,,, | Performed by: OPTOMETRIST

## 2021-02-04 PROCEDURE — 99499 UNLISTED E&M SERVICE: CPT | Mod: S$GLB,,, | Performed by: OPTOMETRIST

## 2021-02-04 PROCEDURE — 99499 NO LOS: ICD-10-PCS | Mod: S$GLB,,, | Performed by: OPTOMETRIST

## 2021-02-04 PROCEDURE — 99999 PR PBB SHADOW E&M-EST. PATIENT-LVL II: CPT | Mod: PBBFAC,,, | Performed by: OPTOMETRIST

## 2021-02-04 PROCEDURE — 99999 PR PBB SHADOW E&M-EST. PATIENT-LVL II: ICD-10-PCS | Mod: PBBFAC,,, | Performed by: OPTOMETRIST

## 2021-03-08 ENCOUNTER — OFFICE VISIT (OUTPATIENT)
Dept: OPHTHALMOLOGY | Facility: CLINIC | Age: 66
End: 2021-03-08
Payer: MEDICARE

## 2021-03-08 DIAGNOSIS — H52.4 ASTIGMATISM WITH PRESBYOPIA, BILATERAL: Primary | ICD-10-CM

## 2021-03-08 DIAGNOSIS — H52.203 ASTIGMATISM WITH PRESBYOPIA, BILATERAL: Primary | ICD-10-CM

## 2021-03-08 PROCEDURE — 99499 UNLISTED E&M SERVICE: CPT | Mod: S$GLB,,, | Performed by: OPTOMETRIST

## 2021-03-08 PROCEDURE — 99999 PR PBB SHADOW E&M-EST. PATIENT-LVL II: ICD-10-PCS | Mod: PBBFAC,,, | Performed by: OPTOMETRIST

## 2021-03-08 PROCEDURE — 99999 PR PBB SHADOW E&M-EST. PATIENT-LVL II: CPT | Mod: PBBFAC,,, | Performed by: OPTOMETRIST

## 2021-03-08 PROCEDURE — 99499 NO LOS: ICD-10-PCS | Mod: S$GLB,,, | Performed by: OPTOMETRIST

## 2021-04-09 ENCOUNTER — PES CALL (OUTPATIENT)
Dept: ADMINISTRATIVE | Facility: CLINIC | Age: 66
End: 2021-04-09

## 2021-09-20 ENCOUNTER — TELEPHONE (OUTPATIENT)
Dept: AUDIOLOGY | Facility: CLINIC | Age: 66
End: 2021-09-20

## 2021-09-27 ENCOUNTER — OFFICE VISIT (OUTPATIENT)
Dept: OTOLARYNGOLOGY | Facility: CLINIC | Age: 66
End: 2021-09-27
Payer: MEDICARE

## 2021-09-27 ENCOUNTER — CLINICAL SUPPORT (OUTPATIENT)
Dept: AUDIOLOGY | Facility: CLINIC | Age: 66
End: 2021-09-27
Payer: MEDICARE

## 2021-09-27 VITALS — WEIGHT: 211.19 LBS | TEMPERATURE: 98 F | HEIGHT: 68 IN | BODY MASS INDEX: 32.01 KG/M2

## 2021-09-27 DIAGNOSIS — H93.13 TINNITUS, BILATERAL: ICD-10-CM

## 2021-09-27 DIAGNOSIS — H90.3 SENSORINEURAL HEARING LOSS, BILATERAL: Primary | ICD-10-CM

## 2021-09-27 DIAGNOSIS — H90.3 SENSORINEURAL HEARING LOSS (SNHL) OF BOTH EARS: Primary | ICD-10-CM

## 2021-09-27 PROCEDURE — 3072F PR LOW RISK FOR RETINOPATHY: ICD-10-PCS | Mod: HCNC,CPTII,S$GLB, | Performed by: PHYSICIAN ASSISTANT

## 2021-09-27 PROCEDURE — 92557 COMPREHENSIVE HEARING TEST: CPT | Mod: HCNC,S$GLB,, | Performed by: AUDIOLOGIST-HEARING AID FITTER

## 2021-09-27 PROCEDURE — 3008F PR BODY MASS INDEX (BMI) DOCUMENTED: ICD-10-PCS | Mod: HCNC,CPTII,S$GLB, | Performed by: PHYSICIAN ASSISTANT

## 2021-09-27 PROCEDURE — 92567 TYMPANOMETRY: CPT | Mod: HCNC,S$GLB,, | Performed by: AUDIOLOGIST-HEARING AID FITTER

## 2021-09-27 PROCEDURE — 3072F LOW RISK FOR RETINOPATHY: CPT | Mod: HCNC,CPTII,S$GLB, | Performed by: PHYSICIAN ASSISTANT

## 2021-09-27 PROCEDURE — 99999 PR PBB SHADOW E&M-EST. PATIENT-LVL III: CPT | Mod: PBBFAC,HCNC,, | Performed by: PHYSICIAN ASSISTANT

## 2021-09-27 PROCEDURE — 99203 PR OFFICE/OUTPT VISIT, NEW, LEVL III, 30-44 MIN: ICD-10-PCS | Mod: HCNC,S$GLB,, | Performed by: PHYSICIAN ASSISTANT

## 2021-09-27 PROCEDURE — 3008F BODY MASS INDEX DOCD: CPT | Mod: HCNC,CPTII,S$GLB, | Performed by: PHYSICIAN ASSISTANT

## 2021-09-27 PROCEDURE — 92567 PR TYMPA2METRY: ICD-10-PCS | Mod: HCNC,S$GLB,, | Performed by: AUDIOLOGIST-HEARING AID FITTER

## 2021-09-27 PROCEDURE — 99203 OFFICE O/P NEW LOW 30 MIN: CPT | Mod: HCNC,S$GLB,, | Performed by: PHYSICIAN ASSISTANT

## 2021-09-27 PROCEDURE — 1159F MED LIST DOCD IN RCRD: CPT | Mod: HCNC,CPTII,S$GLB, | Performed by: PHYSICIAN ASSISTANT

## 2021-09-27 PROCEDURE — 99999 PR PBB SHADOW E&M-EST. PATIENT-LVL III: ICD-10-PCS | Mod: PBBFAC,HCNC,, | Performed by: PHYSICIAN ASSISTANT

## 2021-09-27 PROCEDURE — 92557 PR COMPREHENSIVE HEARING TEST: ICD-10-PCS | Mod: HCNC,S$GLB,, | Performed by: AUDIOLOGIST-HEARING AID FITTER

## 2021-09-27 PROCEDURE — 1159F PR MEDICATION LIST DOCUMENTED IN MEDICAL RECORD: ICD-10-PCS | Mod: HCNC,CPTII,S$GLB, | Performed by: PHYSICIAN ASSISTANT

## 2021-10-21 ENCOUNTER — OFFICE VISIT (OUTPATIENT)
Dept: FAMILY MEDICINE | Facility: CLINIC | Age: 66
End: 2021-10-21
Payer: MEDICARE

## 2021-10-21 ENCOUNTER — LAB VISIT (OUTPATIENT)
Dept: LAB | Facility: HOSPITAL | Age: 66
End: 2021-10-21
Attending: INTERNAL MEDICINE
Payer: MEDICARE

## 2021-10-21 VITALS
DIASTOLIC BLOOD PRESSURE: 82 MMHG | OXYGEN SATURATION: 97 % | WEIGHT: 212.31 LBS | HEART RATE: 64 BPM | TEMPERATURE: 98 F | RESPIRATION RATE: 16 BRPM | SYSTOLIC BLOOD PRESSURE: 128 MMHG | BODY MASS INDEX: 32.28 KG/M2

## 2021-10-21 DIAGNOSIS — D50.0 IRON DEFICIENCY ANEMIA DUE TO CHRONIC BLOOD LOSS: ICD-10-CM

## 2021-10-21 DIAGNOSIS — Z12.5 ENCOUNTER FOR PROSTATE CANCER SCREENING: ICD-10-CM

## 2021-10-21 DIAGNOSIS — E78.5 HYPERLIPIDEMIA, UNSPECIFIED HYPERLIPIDEMIA TYPE: ICD-10-CM

## 2021-10-21 DIAGNOSIS — E11.65 TYPE 2 DIABETES MELLITUS WITH HYPERGLYCEMIA, WITHOUT LONG-TERM CURRENT USE OF INSULIN: Primary | ICD-10-CM

## 2021-10-21 DIAGNOSIS — E11.65 TYPE 2 DIABETES MELLITUS WITH HYPERGLYCEMIA, WITHOUT LONG-TERM CURRENT USE OF INSULIN: ICD-10-CM

## 2021-10-21 PROBLEM — F20.0 PARANOID SCHIZOPHRENIA: Status: RESOLVED | Noted: 2017-10-16 | Resolved: 2021-10-21

## 2021-10-21 PROCEDURE — 3008F PR BODY MASS INDEX (BMI) DOCUMENTED: ICD-10-PCS | Mod: HCNC,CPTII,S$GLB, | Performed by: INTERNAL MEDICINE

## 2021-10-21 PROCEDURE — 3079F DIAST BP 80-89 MM HG: CPT | Mod: HCNC,CPTII,S$GLB, | Performed by: INTERNAL MEDICINE

## 2021-10-21 PROCEDURE — 85025 COMPLETE CBC W/AUTO DIFF WBC: CPT | Mod: HCNC | Performed by: INTERNAL MEDICINE

## 2021-10-21 PROCEDURE — 3074F PR MOST RECENT SYSTOLIC BLOOD PRESSURE < 130 MM HG: ICD-10-PCS | Mod: HCNC,CPTII,S$GLB, | Performed by: INTERNAL MEDICINE

## 2021-10-21 PROCEDURE — 99499 UNLISTED E&M SERVICE: CPT | Mod: S$GLB,,, | Performed by: INTERNAL MEDICINE

## 2021-10-21 PROCEDURE — 3051F HG A1C>EQUAL 7.0%<8.0%: CPT | Mod: HCNC,CPTII,S$GLB, | Performed by: INTERNAL MEDICINE

## 2021-10-21 PROCEDURE — 3072F PR LOW RISK FOR RETINOPATHY: ICD-10-PCS | Mod: HCNC,CPTII,S$GLB, | Performed by: INTERNAL MEDICINE

## 2021-10-21 PROCEDURE — 83036 HEMOGLOBIN GLYCOSYLATED A1C: CPT | Mod: HCNC | Performed by: INTERNAL MEDICINE

## 2021-10-21 PROCEDURE — 3051F PR MOST RECENT HEMOGLOBIN A1C LEVEL 7.0 - < 8.0%: ICD-10-PCS | Mod: HCNC,CPTII,S$GLB, | Performed by: INTERNAL MEDICINE

## 2021-10-21 PROCEDURE — 99999 PR PBB SHADOW E&M-EST. PATIENT-LVL IV: CPT | Mod: PBBFAC,HCNC,, | Performed by: INTERNAL MEDICINE

## 2021-10-21 PROCEDURE — 99499 RISK ADDL DX/OHS AUDIT: ICD-10-PCS | Mod: S$GLB,,, | Performed by: INTERNAL MEDICINE

## 2021-10-21 PROCEDURE — 99214 OFFICE O/P EST MOD 30 MIN: CPT | Mod: HCNC,S$GLB,, | Performed by: INTERNAL MEDICINE

## 2021-10-21 PROCEDURE — 99999 PR PBB SHADOW E&M-EST. PATIENT-LVL IV: ICD-10-PCS | Mod: PBBFAC,HCNC,, | Performed by: INTERNAL MEDICINE

## 2021-10-21 PROCEDURE — 36415 COLL VENOUS BLD VENIPUNCTURE: CPT | Mod: HCNC,PO | Performed by: INTERNAL MEDICINE

## 2021-10-21 PROCEDURE — 80061 LIPID PANEL: CPT | Mod: HCNC | Performed by: INTERNAL MEDICINE

## 2021-10-21 PROCEDURE — 3079F PR MOST RECENT DIASTOLIC BLOOD PRESSURE 80-89 MM HG: ICD-10-PCS | Mod: HCNC,CPTII,S$GLB, | Performed by: INTERNAL MEDICINE

## 2021-10-21 PROCEDURE — 3072F LOW RISK FOR RETINOPATHY: CPT | Mod: HCNC,CPTII,S$GLB, | Performed by: INTERNAL MEDICINE

## 2021-10-21 PROCEDURE — 3074F SYST BP LT 130 MM HG: CPT | Mod: HCNC,CPTII,S$GLB, | Performed by: INTERNAL MEDICINE

## 2021-10-21 PROCEDURE — 84153 ASSAY OF PSA TOTAL: CPT | Mod: HCNC | Performed by: INTERNAL MEDICINE

## 2021-10-21 PROCEDURE — 99214 PR OFFICE/OUTPT VISIT, EST, LEVL IV, 30-39 MIN: ICD-10-PCS | Mod: HCNC,S$GLB,, | Performed by: INTERNAL MEDICINE

## 2021-10-21 PROCEDURE — 1159F PR MEDICATION LIST DOCUMENTED IN MEDICAL RECORD: ICD-10-PCS | Mod: HCNC,CPTII,S$GLB, | Performed by: INTERNAL MEDICINE

## 2021-10-21 PROCEDURE — 1159F MED LIST DOCD IN RCRD: CPT | Mod: HCNC,CPTII,S$GLB, | Performed by: INTERNAL MEDICINE

## 2021-10-21 PROCEDURE — 80053 COMPREHEN METABOLIC PANEL: CPT | Mod: HCNC | Performed by: INTERNAL MEDICINE

## 2021-10-21 PROCEDURE — 3008F BODY MASS INDEX DOCD: CPT | Mod: HCNC,CPTII,S$GLB, | Performed by: INTERNAL MEDICINE

## 2021-10-22 ENCOUNTER — TELEPHONE (OUTPATIENT)
Dept: FAMILY MEDICINE | Facility: CLINIC | Age: 66
End: 2021-10-22
Payer: MEDICARE

## 2021-10-22 LAB
ALBUMIN SERPL BCP-MCNC: 4.2 G/DL (ref 3.5–5.2)
ALP SERPL-CCNC: 67 U/L (ref 55–135)
ALT SERPL W/O P-5'-P-CCNC: 21 U/L (ref 10–44)
ANION GAP SERPL CALC-SCNC: 11 MMOL/L (ref 8–16)
AST SERPL-CCNC: 17 U/L (ref 10–40)
BASOPHILS # BLD AUTO: 0.07 K/UL (ref 0–0.2)
BASOPHILS NFR BLD: 0.9 % (ref 0–1.9)
BILIRUB SERPL-MCNC: 0.5 MG/DL (ref 0.1–1)
BUN SERPL-MCNC: 12 MG/DL (ref 8–23)
CALCIUM SERPL-MCNC: 9.6 MG/DL (ref 8.7–10.5)
CHLORIDE SERPL-SCNC: 103 MMOL/L (ref 95–110)
CHOLEST SERPL-MCNC: 185 MG/DL (ref 120–199)
CHOLEST/HDLC SERPL: 4.2 {RATIO} (ref 2–5)
CO2 SERPL-SCNC: 26 MMOL/L (ref 23–29)
COMPLEXED PSA SERPL-MCNC: 0.75 NG/ML (ref 0–4)
CREAT SERPL-MCNC: 0.9 MG/DL (ref 0.5–1.4)
DIFFERENTIAL METHOD: ABNORMAL
EOSINOPHIL # BLD AUTO: 0.1 K/UL (ref 0–0.5)
EOSINOPHIL NFR BLD: 1.6 % (ref 0–8)
ERYTHROCYTE [DISTWIDTH] IN BLOOD BY AUTOMATED COUNT: 13 % (ref 11.5–14.5)
EST. GFR  (AFRICAN AMERICAN): >60 ML/MIN/1.73 M^2
EST. GFR  (NON AFRICAN AMERICAN): >60 ML/MIN/1.73 M^2
ESTIMATED AVG GLUCOSE: 157 MG/DL (ref 68–131)
GLUCOSE SERPL-MCNC: 120 MG/DL (ref 70–110)
HBA1C MFR BLD: 7.1 % (ref 4–5.6)
HCT VFR BLD AUTO: 42.4 % (ref 40–54)
HDLC SERPL-MCNC: 44 MG/DL (ref 40–75)
HDLC SERPL: 23.8 % (ref 20–50)
HGB BLD-MCNC: 13.3 G/DL (ref 14–18)
IMM GRANULOCYTES # BLD AUTO: 0.03 K/UL (ref 0–0.04)
IMM GRANULOCYTES NFR BLD AUTO: 0.4 % (ref 0–0.5)
LDLC SERPL CALC-MCNC: 117.6 MG/DL (ref 63–159)
LYMPHOCYTES # BLD AUTO: 2.7 K/UL (ref 1–4.8)
LYMPHOCYTES NFR BLD: 35.8 % (ref 18–48)
MCH RBC QN AUTO: 28.3 PG (ref 27–31)
MCHC RBC AUTO-ENTMCNC: 31.4 G/DL (ref 32–36)
MCV RBC AUTO: 90 FL (ref 82–98)
MONOCYTES # BLD AUTO: 0.5 K/UL (ref 0.3–1)
MONOCYTES NFR BLD: 7 % (ref 4–15)
NEUTROPHILS # BLD AUTO: 4.1 K/UL (ref 1.8–7.7)
NEUTROPHILS NFR BLD: 54.3 % (ref 38–73)
NONHDLC SERPL-MCNC: 141 MG/DL
NRBC BLD-RTO: 0 /100 WBC
PLATELET # BLD AUTO: 266 K/UL (ref 150–450)
PMV BLD AUTO: 11 FL (ref 9.2–12.9)
POTASSIUM SERPL-SCNC: 4.2 MMOL/L (ref 3.5–5.1)
PROT SERPL-MCNC: 7.2 G/DL (ref 6–8.4)
RBC # BLD AUTO: 4.7 M/UL (ref 4.6–6.2)
SODIUM SERPL-SCNC: 140 MMOL/L (ref 136–145)
TRIGL SERPL-MCNC: 117 MG/DL (ref 30–150)
WBC # BLD AUTO: 7.6 K/UL (ref 3.9–12.7)

## 2022-01-26 ENCOUNTER — PATIENT OUTREACH (OUTPATIENT)
Dept: ADMINISTRATIVE | Facility: HOSPITAL | Age: 67
End: 2022-01-26
Payer: MEDICARE

## 2022-11-03 ENCOUNTER — TELEPHONE (OUTPATIENT)
Dept: ADMINISTRATIVE | Facility: HOSPITAL | Age: 67
End: 2022-11-03
Payer: MEDICARE

## 2024-07-21 NOTE — PROGRESS NOTES
[facility] BB-TCA NEPH REPORT NO INFORMATION FOUND. Nothing in [employer], [facility], [facility], [employer], Care Everywhere, and Media.       2 seconds or less